# Patient Record
Sex: MALE | Race: WHITE | NOT HISPANIC OR LATINO
[De-identification: names, ages, dates, MRNs, and addresses within clinical notes are randomized per-mention and may not be internally consistent; named-entity substitution may affect disease eponyms.]

---

## 2017-04-24 ENCOUNTER — APPOINTMENT (OUTPATIENT)
Dept: HEART AND VASCULAR | Facility: CLINIC | Age: 57
End: 2017-04-24

## 2017-06-07 ENCOUNTER — APPOINTMENT (OUTPATIENT)
Dept: HEART AND VASCULAR | Facility: CLINIC | Age: 57
End: 2017-06-07

## 2017-06-07 VITALS
SYSTOLIC BLOOD PRESSURE: 120 MMHG | HEART RATE: 42 BPM | WEIGHT: 179 LBS | DIASTOLIC BLOOD PRESSURE: 80 MMHG | BODY MASS INDEX: 27.13 KG/M2 | HEIGHT: 68 IN

## 2017-06-09 ENCOUNTER — TRANSCRIPTION ENCOUNTER (OUTPATIENT)
Age: 57
End: 2017-06-09

## 2017-07-05 ENCOUNTER — APPOINTMENT (OUTPATIENT)
Dept: HEART AND VASCULAR | Facility: CLINIC | Age: 57
End: 2017-07-05

## 2017-07-05 VITALS
BODY MASS INDEX: 27.74 KG/M2 | WEIGHT: 183 LBS | DIASTOLIC BLOOD PRESSURE: 70 MMHG | HEART RATE: 70 BPM | HEIGHT: 68 IN | SYSTOLIC BLOOD PRESSURE: 110 MMHG

## 2019-02-13 ENCOUNTER — NON-APPOINTMENT (OUTPATIENT)
Age: 59
End: 2019-02-13

## 2019-02-13 ENCOUNTER — APPOINTMENT (OUTPATIENT)
Dept: HEART AND VASCULAR | Facility: CLINIC | Age: 59
End: 2019-02-13
Payer: COMMERCIAL

## 2019-02-13 VITALS
BODY MASS INDEX: 25.31 KG/M2 | DIASTOLIC BLOOD PRESSURE: 90 MMHG | SYSTOLIC BLOOD PRESSURE: 142 MMHG | HEART RATE: 53 BPM | WEIGHT: 167 LBS | HEIGHT: 68 IN

## 2019-02-13 VITALS — SYSTOLIC BLOOD PRESSURE: 150 MMHG | DIASTOLIC BLOOD PRESSURE: 66 MMHG

## 2019-02-13 PROCEDURE — 99243 OFF/OP CNSLTJ NEW/EST LOW 30: CPT | Mod: 25

## 2019-02-13 PROCEDURE — 93000 ELECTROCARDIOGRAM COMPLETE: CPT

## 2019-02-13 NOTE — PHYSICAL EXAM
[General Appearance - Well Developed] : well developed [Normal Appearance] : normal appearance [Well Groomed] : well groomed [General Appearance - Well Nourished] : well nourished [No Deformities] : no deformities [General Appearance - In No Acute Distress] : no acute distress [Normal Conjunctiva] : the conjunctiva exhibited no abnormalities [Eyelids - No Xanthelasma] : the eyelids demonstrated no xanthelasmas [Normal Oral Mucosa] : normal oral mucosa [No Oral Pallor] : no oral pallor [No Oral Cyanosis] : no oral cyanosis [Normal Jugular Venous A Waves Present] : normal jugular venous A waves present [Normal Jugular Venous V Waves Present] : normal jugular venous V waves present [No Jugular Venous Negrete A Waves] : no jugular venous negrete A waves [Respiration, Rhythm And Depth] : normal respiratory rhythm and effort [Exaggerated Use Of Accessory Muscles For Inspiration] : no accessory muscle use [Auscultation Breath Sounds / Voice Sounds] : lungs were clear to auscultation bilaterally [Heart Rate And Rhythm] : heart rate and rhythm were normal [Heart Sounds] : normal S1 and S2 [Murmurs] : no murmurs present [Arterial Pulses Normal] : the arterial pulses were normal [Abdomen Soft] : soft [Abdomen Tenderness] : non-tender [Abdomen Mass (___ Cm)] : no abdominal mass palpated [Abnormal Walk] : normal gait [Gait - Sufficient For Exercise Testing] : the gait was sufficient for exercise testing [Nail Clubbing] : no clubbing of the fingernails [Cyanosis, Localized] : no localized cyanosis [Petechial Hemorrhages (___cm)] : no petechial hemorrhages [Skin Color & Pigmentation] : normal skin color and pigmentation [] : no rash [No Venous Stasis] : no venous stasis [Skin Lesions] : no skin lesions [No Skin Ulcers] : no skin ulcer [No Xanthoma] : no  xanthoma was observed [Oriented To Time, Place, And Person] : oriented to person, place, and time [Affect] : the affect was normal [Mood] : the mood was normal [No Anxiety] : not feeling anxious

## 2019-02-20 NOTE — DISCUSSION/SUMMARY
[FreeTextEntry1] : The patient has carotid plaque and hypercholesterolemia. His blood pressure is elevated today. He has been under stress. The patient's diet is much improved and he has been exercising somewhat. The EKG is normal. Her cholesterol has been well controlled. The patient had a normal stress test 1.5 years ago. He defers a CAT scan angiogram. We will do an exercise test in six months. He will continue his current medication.
Cane/Eyeglasses

## 2019-02-20 NOTE — HISTORY OF PRESENT ILLNESS
[FreeTextEntry1] : The patient has been on a diet and lost weight. He has reduced his intake of snacks and carbohydrates and increased his intake of protein and salad. The patient exercises on the treadmill once or twice a week. He also walks. The patient has had indigestion late at night once every 2-3 months after that tomato sauce. It starts at night and lasts until the next day. The patient has occasional alcohol. He feels the need to take a deep breath. His blood pressure was recently 130/80.

## 2019-03-21 ENCOUNTER — APPOINTMENT (OUTPATIENT)
Dept: HEART AND VASCULAR | Facility: CLINIC | Age: 59
End: 2019-03-21

## 2019-04-15 ENCOUNTER — APPOINTMENT (OUTPATIENT)
Dept: HEART AND VASCULAR | Facility: CLINIC | Age: 59
End: 2019-04-15
Payer: COMMERCIAL

## 2019-04-15 VITALS — WEIGHT: 165 LBS | DIASTOLIC BLOOD PRESSURE: 66 MMHG | SYSTOLIC BLOOD PRESSURE: 142 MMHG | BODY MASS INDEX: 25.09 KG/M2

## 2019-04-15 PROCEDURE — 99213 OFFICE O/P EST LOW 20 MIN: CPT

## 2019-04-16 NOTE — DISCUSSION/SUMMARY
[FreeTextEntry1] : The patient's blood pressure is improved but still slightly elevated. He will attempt to improve his diet. He has an elevated calcium score. The patient will return for a stress test. He will continue his current medication.

## 2019-04-16 NOTE — PHYSICAL EXAM
[Normal Appearance] : normal appearance [General Appearance - Well Developed] : well developed [No Deformities] : no deformities [General Appearance - Well Nourished] : well nourished [Well Groomed] : well groomed [General Appearance - In No Acute Distress] : no acute distress [Eyelids - No Xanthelasma] : the eyelids demonstrated no xanthelasmas [Normal Conjunctiva] : the conjunctiva exhibited no abnormalities [Normal Oral Mucosa] : normal oral mucosa [No Oral Pallor] : no oral pallor [Normal Jugular Venous V Waves Present] : normal jugular venous V waves present [Normal Jugular Venous A Waves Present] : normal jugular venous A waves present [No Oral Cyanosis] : no oral cyanosis [Respiration, Rhythm And Depth] : normal respiratory rhythm and effort [No Jugular Venous Negrete A Waves] : no jugular venous negrete A waves [Heart Rate And Rhythm] : heart rate and rhythm were normal [Exaggerated Use Of Accessory Muscles For Inspiration] : no accessory muscle use [Auscultation Breath Sounds / Voice Sounds] : lungs were clear to auscultation bilaterally [Heart Sounds] : normal S1 and S2 [Murmurs] : no murmurs present [Arterial Pulses Normal] : the arterial pulses were normal [Abdomen Tenderness] : non-tender [Abdomen Soft] : soft [Abdomen Mass (___ Cm)] : no abdominal mass palpated [Abnormal Walk] : normal gait [Gait - Sufficient For Exercise Testing] : the gait was sufficient for exercise testing [Petechial Hemorrhages (___cm)] : no petechial hemorrhages [Nail Clubbing] : no clubbing of the fingernails [Cyanosis, Localized] : no localized cyanosis [Skin Color & Pigmentation] : normal skin color and pigmentation [Skin Lesions] : no skin lesions [] : no rash [No Venous Stasis] : no venous stasis [No Xanthoma] : no  xanthoma was observed [No Skin Ulcers] : no skin ulcer [Oriented To Time, Place, And Person] : oriented to person, place, and time [Affect] : the affect was normal [Mood] : the mood was normal [No Anxiety] : not feeling anxious

## 2019-04-16 NOTE — HISTORY OF PRESENT ILLNESS
[FreeTextEntry1] : The patient's diet is fairly good. His weight has been stable. He exercises twice a week.

## 2019-06-26 ENCOUNTER — APPOINTMENT (OUTPATIENT)
Dept: HEART AND VASCULAR | Facility: CLINIC | Age: 59
End: 2019-06-26
Payer: COMMERCIAL

## 2019-06-26 PROCEDURE — 93880 EXTRACRANIAL BILAT STUDY: CPT

## 2019-07-11 ENCOUNTER — APPOINTMENT (OUTPATIENT)
Dept: HEART AND VASCULAR | Facility: CLINIC | Age: 59
End: 2019-07-11

## 2019-07-24 ENCOUNTER — APPOINTMENT (OUTPATIENT)
Dept: HEART AND VASCULAR | Facility: CLINIC | Age: 59
End: 2019-07-24
Payer: COMMERCIAL

## 2019-07-24 VITALS
SYSTOLIC BLOOD PRESSURE: 136 MMHG | WEIGHT: 165 LBS | DIASTOLIC BLOOD PRESSURE: 80 MMHG | HEART RATE: 59 BPM | BODY MASS INDEX: 25.01 KG/M2 | HEIGHT: 68 IN

## 2019-07-24 PROCEDURE — 99243 OFF/OP CNSLTJ NEW/EST LOW 30: CPT | Mod: 25

## 2019-07-24 PROCEDURE — 93351 STRESS TTE COMPLETE: CPT

## 2019-07-25 NOTE — DISCUSSION/SUMMARY
[FreeTextEntry1] : The patient has atypical/non-anginal chest discomfort. He has no exertional symptoms. The stress echocardiogram was normal. His blood pressure was could improve. Diet and exercise were discussed. He will continue his current medication.

## 2019-07-25 NOTE — HISTORY OF PRESENT ILLNESS
[FreeTextEntry1] : The patient runs 3 miles without difficulty. He has occasional left lateral localized chest discomfort. It is mild and occurs at any time. He has had a vague sense of dizziness which does not interfere with his activities.

## 2019-08-26 ENCOUNTER — TRANSCRIPTION ENCOUNTER (OUTPATIENT)
Age: 59
End: 2019-08-26

## 2020-02-20 ENCOUNTER — APPOINTMENT (OUTPATIENT)
Dept: HEART AND VASCULAR | Facility: CLINIC | Age: 60
End: 2020-02-20
Payer: COMMERCIAL

## 2020-02-20 ENCOUNTER — NON-APPOINTMENT (OUTPATIENT)
Age: 60
End: 2020-02-20

## 2020-02-20 VITALS
WEIGHT: 170 LBS | HEIGHT: 68 IN | SYSTOLIC BLOOD PRESSURE: 144 MMHG | BODY MASS INDEX: 25.76 KG/M2 | HEART RATE: 58 BPM | DIASTOLIC BLOOD PRESSURE: 88 MMHG

## 2020-02-20 VITALS — DIASTOLIC BLOOD PRESSURE: 72 MMHG | SYSTOLIC BLOOD PRESSURE: 146 MMHG

## 2020-02-20 PROCEDURE — 93000 ELECTROCARDIOGRAM COMPLETE: CPT

## 2020-02-20 PROCEDURE — 99214 OFFICE O/P EST MOD 30 MIN: CPT | Mod: 25

## 2020-02-20 NOTE — PHYSICAL EXAM
[General Appearance - Well Developed] : well developed [Normal Appearance] : normal appearance [Well Groomed] : well groomed [No Deformities] : no deformities [General Appearance - Well Nourished] : well nourished [General Appearance - In No Acute Distress] : no acute distress [Normal Conjunctiva] : the conjunctiva exhibited no abnormalities [Eyelids - No Xanthelasma] : the eyelids demonstrated no xanthelasmas [Normal Oral Mucosa] : normal oral mucosa [No Oral Pallor] : no oral pallor [No Oral Cyanosis] : no oral cyanosis [Normal Jugular Venous A Waves Present] : normal jugular venous A waves present [Normal Jugular Venous V Waves Present] : normal jugular venous V waves present [No Jugular Venous Negrete A Waves] : no jugular venous negrete A waves [Respiration, Rhythm And Depth] : normal respiratory rhythm and effort [Auscultation Breath Sounds / Voice Sounds] : lungs were clear to auscultation bilaterally [Exaggerated Use Of Accessory Muscles For Inspiration] : no accessory muscle use [Heart Rate And Rhythm] : heart rate and rhythm were normal [Heart Sounds] : normal S1 and S2 [Arterial Pulses Normal] : the arterial pulses were normal [Murmurs] : no murmurs present [Abdomen Soft] : soft [Abdomen Tenderness] : non-tender [Abnormal Walk] : normal gait [Abdomen Mass (___ Cm)] : no abdominal mass palpated [Gait - Sufficient For Exercise Testing] : the gait was sufficient for exercise testing [Cyanosis, Localized] : no localized cyanosis [Petechial Hemorrhages (___cm)] : no petechial hemorrhages [Nail Clubbing] : no clubbing of the fingernails [Skin Color & Pigmentation] : normal skin color and pigmentation [] : no rash [No Venous Stasis] : no venous stasis [No Skin Ulcers] : no skin ulcer [Skin Lesions] : no skin lesions [No Xanthoma] : no  xanthoma was observed [Affect] : the affect was normal [Mood] : the mood was normal [Oriented To Time, Place, And Person] : oriented to person, place, and time [No Anxiety] : not feeling anxious

## 2020-02-20 NOTE — HISTORY OF PRESENT ILLNESS
[FreeTextEntry1] : The patient exercises with a  once a week. He does aerobic exercise 2-3 times a week. His diet is fair. His recent blood pressure at Dr. Decker was 130/80.

## 2020-02-20 NOTE — DISCUSSION/SUMMARY
[FreeTextEntry1] : The patient has a history of an elevated calcium score. His blood pressure is elevated. His EKG is essentially normal. The patient had a recent normal blood pressure reading. He will check his blood pressure at home. His cholesterol profile is excellent. He will attempt to improve his diet. He will continue his current medication.

## 2020-04-30 ENCOUNTER — TRANSCRIPTION ENCOUNTER (OUTPATIENT)
Age: 60
End: 2020-04-30

## 2020-09-10 ENCOUNTER — APPOINTMENT (OUTPATIENT)
Dept: HEART AND VASCULAR | Facility: CLINIC | Age: 60
End: 2020-09-10
Payer: COMMERCIAL

## 2020-09-10 ENCOUNTER — NON-APPOINTMENT (OUTPATIENT)
Age: 60
End: 2020-09-10

## 2020-09-10 VITALS — HEART RATE: 50 BPM | TEMPERATURE: 98.6 F | WEIGHT: 157 LBS | BODY MASS INDEX: 23.79 KG/M2 | HEIGHT: 68 IN

## 2020-09-10 VITALS — SYSTOLIC BLOOD PRESSURE: 150 MMHG | DIASTOLIC BLOOD PRESSURE: 72 MMHG

## 2020-09-10 DIAGNOSIS — R03.0 ELEVATED BLOOD-PRESSURE READING, W/OUT DIAGNOSIS OF HYPERTENSION: ICD-10-CM

## 2020-09-10 PROCEDURE — 93000 ELECTROCARDIOGRAM COMPLETE: CPT

## 2020-09-10 PROCEDURE — 99214 OFFICE O/P EST MOD 30 MIN: CPT | Mod: 25

## 2020-09-10 RX ORDER — DOXEPIN HYDROCHLORIDE 75 MG/1
CAPSULE ORAL
Refills: 0 | Status: ACTIVE | COMMUNITY

## 2020-09-10 NOTE — HISTORY OF PRESENT ILLNESS
[FreeTextEntry1] : The patient exercises three times a week. He runs for 45 minutes. His diet is fair. At the endocrinologist his blood pressure was 164 systolic. At home his blood pressure is 128-150/66-80. His sugar is 105. He is in bed for eight hours  a night. He occasionally drinks alcohol two-three times a week. He drinks decaffeinated coffee.

## 2020-09-10 NOTE — PHYSICAL EXAM
[Normal Appearance] : normal appearance [General Appearance - Well Developed] : well developed [Well Groomed] : well groomed [General Appearance - Well Nourished] : well nourished [No Deformities] : no deformities [General Appearance - In No Acute Distress] : no acute distress [Normal Conjunctiva] : the conjunctiva exhibited no abnormalities [Eyelids - No Xanthelasma] : the eyelids demonstrated no xanthelasmas [Normal Oral Mucosa] : normal oral mucosa [No Oral Pallor] : no oral pallor [No Oral Cyanosis] : no oral cyanosis [Normal Jugular Venous A Waves Present] : normal jugular venous A waves present [No Jugular Venous Negrete A Waves] : no jugular venous negrete A waves [Normal Jugular Venous V Waves Present] : normal jugular venous V waves present [Exaggerated Use Of Accessory Muscles For Inspiration] : no accessory muscle use [Respiration, Rhythm And Depth] : normal respiratory rhythm and effort [Auscultation Breath Sounds / Voice Sounds] : lungs were clear to auscultation bilaterally [Heart Rate And Rhythm] : heart rate and rhythm were normal [Heart Sounds] : normal S1 and S2 [Murmurs] : no murmurs present [Arterial Pulses Normal] : the arterial pulses were normal [Abdomen Soft] : soft [Abdomen Tenderness] : non-tender [Abdomen Mass (___ Cm)] : no abdominal mass palpated [Abnormal Walk] : normal gait [Nail Clubbing] : no clubbing of the fingernails [Gait - Sufficient For Exercise Testing] : the gait was sufficient for exercise testing [Petechial Hemorrhages (___cm)] : no petechial hemorrhages [Cyanosis, Localized] : no localized cyanosis [] : no rash [Skin Color & Pigmentation] : normal skin color and pigmentation [No Venous Stasis] : no venous stasis [Skin Lesions] : no skin lesions [No Xanthoma] : no  xanthoma was observed [No Skin Ulcers] : no skin ulcer [Oriented To Time, Place, And Person] : oriented to person, place, and time [Affect] : the affect was normal [Mood] : the mood was normal [No Anxiety] : not feeling anxious

## 2020-09-10 NOTE — DISCUSSION/SUMMARY
[FreeTextEntry1] : The patient is physically active and asymptomatic. His EKG is normal. His blood pressure at home has been slightly elevated. It is elevated in the office. He will start losartan 50 mg daily. He has a very high calcium score. He is at significant risk for coronary artery disease. The patient will undergo a CAT scan angiogram of his coronaries. We had a long discussion about taking his blood pressure at home proper diet and implications of his elevated reading. Also discussed was the risks benefits and alternatives to the CAT scan angiogram.

## 2020-09-23 ENCOUNTER — RESULT REVIEW (OUTPATIENT)
Age: 60
End: 2020-09-23

## 2020-09-23 ENCOUNTER — APPOINTMENT (OUTPATIENT)
Dept: CT IMAGING | Facility: CLINIC | Age: 60
End: 2020-09-23
Payer: SELF-PAY

## 2020-09-23 ENCOUNTER — OUTPATIENT (OUTPATIENT)
Dept: OUTPATIENT SERVICES | Facility: HOSPITAL | Age: 60
LOS: 1 days | End: 2020-09-23

## 2020-09-23 DIAGNOSIS — R93.1 ABNORMAL FINDINGS ON DIAGNOSTIC IMAGING OF HEART AND CORONARY CIRCULATION: ICD-10-CM

## 2020-09-23 PROCEDURE — 75574 CT ANGIO HRT W/3D IMAGE: CPT | Mod: 26

## 2020-09-28 ENCOUNTER — OUTPATIENT (OUTPATIENT)
Dept: OUTPATIENT SERVICES | Facility: HOSPITAL | Age: 60
LOS: 1 days | End: 2020-09-28
Payer: COMMERCIAL

## 2020-09-28 PROCEDURE — 0502T: CPT

## 2020-09-28 PROCEDURE — 0503T: CPT

## 2020-09-28 PROCEDURE — 0504T: CPT

## 2020-09-30 ENCOUNTER — APPOINTMENT (OUTPATIENT)
Dept: HEART AND VASCULAR | Facility: CLINIC | Age: 60
End: 2020-09-30
Payer: COMMERCIAL

## 2020-09-30 VITALS
SYSTOLIC BLOOD PRESSURE: 150 MMHG | BODY MASS INDEX: 23.95 KG/M2 | DIASTOLIC BLOOD PRESSURE: 84 MMHG | WEIGHT: 158 LBS | HEIGHT: 68 IN

## 2020-09-30 VITALS — TEMPERATURE: 96.4 F

## 2020-09-30 PROCEDURE — 93351 STRESS TTE COMPLETE: CPT

## 2020-09-30 PROCEDURE — 99214 OFFICE O/P EST MOD 30 MIN: CPT | Mod: 25

## 2020-10-01 NOTE — DISCUSSION/SUMMARY
[FreeTextEntry1] : The patient has a mild to moderate stenosis on his CAT scan angiogram of his coronaries. The FFR was abnormal. Reviewed by Dr. Gross indicates that this is a false positive. The stress echocardiogram is normal. Significant coronary artery disease is unlikely. The patient will have aggressive treatment of his risk factors. His blood pressure today is elevated. However his blood pressure at home is normal. He will continue his current medication.

## 2020-12-13 ENCOUNTER — TRANSCRIPTION ENCOUNTER (OUTPATIENT)
Age: 60
End: 2020-12-13

## 2021-02-17 ENCOUNTER — APPOINTMENT (OUTPATIENT)
Dept: HEART AND VASCULAR | Facility: CLINIC | Age: 61
End: 2021-02-17
Payer: COMMERCIAL

## 2021-02-17 ENCOUNTER — NON-APPOINTMENT (OUTPATIENT)
Age: 61
End: 2021-02-17

## 2021-02-17 VITALS
HEART RATE: 77 BPM | WEIGHT: 156 LBS | DIASTOLIC BLOOD PRESSURE: 96 MMHG | BODY MASS INDEX: 23.64 KG/M2 | SYSTOLIC BLOOD PRESSURE: 154 MMHG | HEIGHT: 68 IN | TEMPERATURE: 97.8 F

## 2021-02-17 VITALS — SYSTOLIC BLOOD PRESSURE: 152 MMHG | DIASTOLIC BLOOD PRESSURE: 82 MMHG

## 2021-02-17 PROCEDURE — 99072 ADDL SUPL MATRL&STAF TM PHE: CPT

## 2021-02-17 PROCEDURE — 99214 OFFICE O/P EST MOD 30 MIN: CPT | Mod: 25

## 2021-02-17 PROCEDURE — 93000 ELECTROCARDIOGRAM COMPLETE: CPT

## 2021-02-17 PROCEDURE — 93306 TTE W/DOPPLER COMPLETE: CPT

## 2021-02-18 ENCOUNTER — APPOINTMENT (OUTPATIENT)
Dept: HEART AND VASCULAR | Facility: CLINIC | Age: 61
End: 2021-02-18

## 2021-02-18 NOTE — PHYSICAL EXAM
[General Appearance - Well Developed] : well developed [Normal Appearance] : normal appearance [Well Groomed] : well groomed [No Deformities] : no deformities [General Appearance - Well Nourished] : well nourished [General Appearance - In No Acute Distress] : no acute distress [Normal Conjunctiva] : the conjunctiva exhibited no abnormalities [Eyelids - No Xanthelasma] : the eyelids demonstrated no xanthelasmas [Normal Oral Mucosa] : normal oral mucosa [No Oral Pallor] : no oral pallor [No Oral Cyanosis] : no oral cyanosis [Normal Jugular Venous A Waves Present] : normal jugular venous A waves present [Normal Jugular Venous V Waves Present] : normal jugular venous V waves present [No Jugular Venous Negrete A Waves] : no jugular venous negrete A waves [Respiration, Rhythm And Depth] : normal respiratory rhythm and effort [Exaggerated Use Of Accessory Muscles For Inspiration] : no accessory muscle use [Auscultation Breath Sounds / Voice Sounds] : lungs were clear to auscultation bilaterally [Heart Sounds] : normal S1 and S2 [Heart Rate And Rhythm] : heart rate and rhythm were normal [Murmurs] : no murmurs present [Arterial Pulses Normal] : the arterial pulses were normal [Abdomen Soft] : soft [Abdomen Tenderness] : non-tender [Abdomen Mass (___ Cm)] : no abdominal mass palpated [Abnormal Walk] : normal gait [Gait - Sufficient For Exercise Testing] : the gait was sufficient for exercise testing [Nail Clubbing] : no clubbing of the fingernails [Cyanosis, Localized] : no localized cyanosis [Petechial Hemorrhages (___cm)] : no petechial hemorrhages [Skin Color & Pigmentation] : normal skin color and pigmentation [] : no rash [No Venous Stasis] : no venous stasis [Skin Lesions] : no skin lesions [No Skin Ulcers] : no skin ulcer [No Xanthoma] : no  xanthoma was observed [Oriented To Time, Place, And Person] : oriented to person, place, and time [Affect] : the affect was normal [Mood] : the mood was normal [No Anxiety] : not feeling anxious

## 2021-02-18 NOTE — HISTORY OF PRESENT ILLNESS
[FreeTextEntry1] : The blood pressure at home is 110–130/60s.  At another physician it was 138/80.  The patient exercises on the bicycle to a good heart rate.  He is conscious of a feeling in his upper chest when he thinks about it but it is not a pain.  He also has mild lightheadedness if his head is not elevated.

## 2021-02-18 NOTE — DISCUSSION/SUMMARY
[FreeTextEntry1] : The patient has possible dizziness and a vague sensation in his chest.  His EKG is normal.  The echocardiogram shows an ejection fraction of 60-65% and mild mitral regurgitation.  One of his cholesterol indices is reduced.  He will increase his Lipitor and take 80 mg daily.  The patient had a recent CT angiogram of his coronaries.  No intervention is necessary.  The possible lightheadedness is not significant.  He will continue his other medication.

## 2021-06-07 ENCOUNTER — APPOINTMENT (OUTPATIENT)
Dept: UROLOGY | Facility: CLINIC | Age: 61
End: 2021-06-07
Payer: COMMERCIAL

## 2021-06-07 PROCEDURE — 99215 OFFICE O/P EST HI 40 MIN: CPT

## 2021-06-07 PROCEDURE — 99072 ADDL SUPL MATRL&STAF TM PHE: CPT

## 2021-06-07 PROCEDURE — 76857 US EXAM PELVIC LIMITED: CPT

## 2021-06-07 RX ORDER — CLONAZEPAM 2 MG/1
TABLET ORAL
Refills: 0 | Status: DISCONTINUED | COMMUNITY
End: 2021-06-07

## 2021-06-07 RX ORDER — CLONAZEPAM 0.5 MG/1
0.5 TABLET ORAL
Qty: 30 | Refills: 0 | Status: ACTIVE | COMMUNITY
Start: 2021-05-07

## 2021-06-07 NOTE — PHYSICAL EXAM
[General Appearance - Well Developed] : well developed [General Appearance - Well Nourished] : well nourished [Normal Appearance] : normal appearance [Well Groomed] : well groomed [General Appearance - In No Acute Distress] : no acute distress [Abdomen Soft] : soft [Abdomen Tenderness] : non-tender [Costovertebral Angle Tenderness] : no ~M costovertebral angle tenderness [Urinary Bladder Findings] : the bladder was normal on palpation [Urethral Meatus] : meatus normal [Scrotum] : the scrotum was normal [Testes Mass (___cm)] : there were no testicular masses [No Prostate Nodules] : no prostate nodules [Edema] : no peripheral edema [] : no respiratory distress [Respiration, Rhythm And Depth] : normal respiratory rhythm and effort [Exaggerated Use Of Accessory Muscles For Inspiration] : no accessory muscle use [Oriented To Time, Place, And Person] : oriented to person, place, and time [Mood] : the mood was normal [Affect] : the affect was normal [Not Anxious] : not anxious [Normal Station and Gait] : the gait and station were normal for the patient's age [No Focal Deficits] : no focal deficits [No Palpable Adenopathy] : no palpable adenopathy [Abdomen Mass (___ Cm)] : no abdominal mass palpated [Abdomen Hernia] : no hernia was discovered [Penis Abnormality] : normal circumcised penis [Epididymis] : the epididymides were normal [Testes Tenderness] : no tenderness of the testes [Prostate Tenderness] : the prostate was not tender [Skin Color & Pigmentation] : normal skin color and pigmentation [Heart Rate And Rhythm] : Heart rate and rhythm were normal

## 2021-06-08 NOTE — ADDENDUM
[FreeTextEntry1] : A portion of this note was written by [Garland Clark] on 06/04/2021 acting as a scribe for Dr. Duarte. \par \par I have personally reviewed the chart and agree that the record accurately reflects my personal performance of the history, physical exam, assessment, and plan.

## 2021-06-08 NOTE — HISTORY OF PRESENT ILLNESS
[FreeTextEntry1] : Mr. CATY PATIÑO comes in today for his urologic follow-up. He reports minimal stable chronic lower urinary tract symptoms (frequency), with nocturia x 1. He underwent a TURP in 2013 with significant improvement.\par IPSS: 2/35\par Sono: 21cc PVR; 35cc prostate\par \par Mr. Patiño had one episode of renal colic in 2013 and underwent a successful ESWL of a ureteral stone.  He has not had any recurrent episodes of colic or hematuria.\par \par Mr. Patiño has a several year onset of erectile dysfunction with difficulty achieving and maintaining erections.  He uses Viagra 50 mg as needed.\par \par PSAs: 2/10/21--2.34; 5/25/19--1.9; 1/29/19--2.3; \par \par MRI: 9/10/18--Tiny hepatic cysts; \par \par Abdominal US: 8/26/15--Normal study; \par \par Renal US: 3/7/19--No stones. 4mm right renal cyst. \par \par CT: 1/2/13--1.0cm left stone (1500 HU) at the L4 level. Minimal hydronephrosis;

## 2021-06-08 NOTE — ASSESSMENT
[FreeTextEntry1] : I discussed the findings and options with Mr. CATY PATIÑO in detail. No intervention is warranted for his stable voiding symptoms.\par \par He will continue with Viagra 50mg prn.\par \par Providing there are no new problems, I look forward to seeing Mr. Patiño in one year (bladder sono, PSA). He will have a renal sonogram at his convenience and I will call him with that result.\par

## 2021-06-08 NOTE — LETTER BODY
[Dear  ___] : Dear  [unfilled], [Consult Letter:] : I had the pleasure of evaluating your patient, [unfilled]. [Please see my note below.] : Please see my note below. [Consult Closing:] : Thank you very much for allowing me to participate in the care of this patient.  If you have any questions, please do not hesitate to contact me. [Sincerely,] : Sincerely, [DrEmiliana  ___] : Dr. BRAUN [FreeTextEntry3] : Sharif Duarte MD, FACS

## 2021-06-09 ENCOUNTER — RESULT CHARGE (OUTPATIENT)
Age: 61
End: 2021-06-09

## 2021-06-09 ENCOUNTER — APPOINTMENT (OUTPATIENT)
Dept: HEART AND VASCULAR | Facility: CLINIC | Age: 61
End: 2021-06-09
Payer: COMMERCIAL

## 2021-06-09 VITALS — DIASTOLIC BLOOD PRESSURE: 76 MMHG | HEIGHT: 68 IN | HEART RATE: 58 BPM | SYSTOLIC BLOOD PRESSURE: 133 MMHG

## 2021-06-09 PROCEDURE — 93880 EXTRACRANIAL BILAT STUDY: CPT

## 2021-06-09 PROCEDURE — 99072 ADDL SUPL MATRL&STAF TM PHE: CPT

## 2021-06-10 ENCOUNTER — NON-APPOINTMENT (OUTPATIENT)
Age: 61
End: 2021-06-10

## 2021-06-10 ENCOUNTER — APPOINTMENT (OUTPATIENT)
Dept: HEART AND VASCULAR | Facility: CLINIC | Age: 61
End: 2021-06-10
Payer: COMMERCIAL

## 2021-06-10 VITALS
TEMPERATURE: 97.8 F | WEIGHT: 161 LBS | BODY MASS INDEX: 24.4 KG/M2 | HEIGHT: 68 IN | SYSTOLIC BLOOD PRESSURE: 120 MMHG | DIASTOLIC BLOOD PRESSURE: 78 MMHG | HEART RATE: 64 BPM

## 2021-06-10 PROCEDURE — 99072 ADDL SUPL MATRL&STAF TM PHE: CPT

## 2021-06-10 PROCEDURE — 99214 OFFICE O/P EST MOD 30 MIN: CPT | Mod: 25

## 2021-06-10 PROCEDURE — 93000 ELECTROCARDIOGRAM COMPLETE: CPT

## 2021-06-11 NOTE — DISCUSSION/SUMMARY
[FreeTextEntry1] : The blood pressure is well controlled.  The patient is physically active.  He will attempt to improve his diet.  He has atypical chest discomfort.  The EKG shows only sinus bradycardia.  The recent CAT scan angiogram does not show significant obstruction.  The chest pain is noncardiac.  The patient has significant bradycardia.  He is asymptomatic.  No intervention is necessary for this.  We will continue to monitor it.  He will continue his current medication.

## 2021-06-11 NOTE — HISTORY OF PRESENT ILLNESS
[FreeTextEntry1] : The blood pressure at home is 110–127/70.  The patient has gained weight.  He exercises vigorously running or bicycling.  After exercise he feels exhausted for a few hours.  He has had a left-sided chest sensation which is in the upper chest and is localized.  It may be related to eating or walking.  The duration is unclear and it is superficial.  He has had tingling in his fingertips which is worse on the left.  His diet is fair.

## 2021-11-22 ENCOUNTER — NON-APPOINTMENT (OUTPATIENT)
Age: 61
End: 2021-11-22

## 2021-11-24 ENCOUNTER — APPOINTMENT (OUTPATIENT)
Dept: ORTHOPEDIC SURGERY | Facility: CLINIC | Age: 61
End: 2021-11-24
Payer: COMMERCIAL

## 2021-11-24 ENCOUNTER — NON-APPOINTMENT (OUTPATIENT)
Age: 61
End: 2021-11-24

## 2021-11-24 PROCEDURE — 99203 OFFICE O/P NEW LOW 30 MIN: CPT

## 2021-11-24 NOTE — DISCUSSION/SUMMARY
[de-identified] : Discussed findings of today's exam and possible causes of patient's pain.  Educated patient on their most probable diagnosis of chronic right lateral hip pain due to peritrochanteric tendinopathy, and chronic right foot pain due to intermetatarsal bursitis.  Reviewed possible courses of treatment, and we collaboratively decided best course of treatment at this time will include conservative management.  Patient will be started on a course of physical therapy to restore normal range of motion and strength as tolerated.  Patient advised to  over-the-counter metatarsal pad to be worn in his foot wear during the day to help alleviate bursitis in this area.  We discussed appropriate activity modification with focus on low impact exercise such as bike/elliptical and to take a break from running while undergoing physical therapy.  If patient has persistent hip pain may consider x-rays at next visit to elucidate how much arthritis he has and what percentage of his pain might be coming from hip arthritis, but based on clinical exam he predominantly has lateral hip pain due to peritrochanteric tendinopathy.  Follow up as needed.  Patient appreciates and agrees with current plan.\par \par I work as part of an academic orthopedic group and routinely have a physician in training (resident / fellow) working with me.  Any part of the history and physical exam performed by the physician in training was either directly reviewed and/or replicated by myself.  Any procedure performed by the physician in training was performed under my direct supervision and with the consent of the patient.\par \par This note was generated using dragon medical dictation software.  A reasonable effort has been made for proofreading its contents, but typos may still remain.  If there are any questions or points of clarification needed please notify my office.\par

## 2021-11-24 NOTE — PHYSICAL EXAM
[de-identified] : Constitutional: Well-nourished, well-developed, No acute distress\par Respiratory:  Good respiratory effort, no SOB\par Psychiatric: Pleasant and normal affect, alert and oriented x3\par Musculoskeletal: normal except where as noted in regional exam\par \par Right Hip:\par \par APPEARANCE: no marked deformities, no swelling or malalignment\par POSITIVE TENDERNESS: Greater trochanter, and mild distally along ITB\par NONTENDER: TFL, gluteal region, ischium/proximal hamstring region, hip flexor region, sartorius and pubic symphysis. \par ROM: full & painless. \par RESISTIVE TESTING: Mild pain in lateral hip with resisted abduction, painless resisted ER/IR/SLR/adduction. \par SPECIAL TESTS: neg KENIA/FADIR, painless loaded flexion & scouring\par \par Right foot:\par APPEARANCE: no swelling, no marked deformities or malalignment\par POSITIVE TENDERNESS: Mild pain between first/second and second/third metatarsal heads\par NONTENDER: 5th metatarsal base, cuboid, 1st MTP, dorsum & plantar surfaces, medial heel, mid heel. \par ROM: normal throughout foot, ankle, and digits. \par RESISTIVE TESTING: painless flex/ext, abd/add of all digits. \par SPECIAL TESTS:  neg Tinel's at tarsal tunnel. \par

## 2021-11-24 NOTE — HISTORY OF PRESENT ILLNESS
[de-identified] : 61 year male presents for evaluation of right lateral hip/groin pain x 3 months and right foot pain x 1.5 months. Denies any specific trauma or injury. He states that he went for a run at about 3 months ago, and felt a popping sensation at the IT band. States for the past few months he has been feeling a sharp pain in the groin. Also complains of right foot pain that occurs with weight bearing. Denies radiating leg pain. Denies numbness/tingling. Leaning on the right leg aggravates the groin pain and pushing off the right foot while walking aggravates the pain. Notes that has a limp with walking. Has tried stretching exercises which he states helps somewhat temporarily. Takes advil occasionally but does not have relief. \par States that he has history of right hamstring injury several years ago.

## 2022-01-20 ENCOUNTER — NON-APPOINTMENT (OUTPATIENT)
Age: 62
End: 2022-01-20

## 2022-01-20 ENCOUNTER — APPOINTMENT (OUTPATIENT)
Dept: HEART AND VASCULAR | Facility: CLINIC | Age: 62
End: 2022-01-20
Payer: COMMERCIAL

## 2022-01-20 VITALS
DIASTOLIC BLOOD PRESSURE: 76 MMHG | HEART RATE: 79 BPM | SYSTOLIC BLOOD PRESSURE: 144 MMHG | OXYGEN SATURATION: 97 % | BODY MASS INDEX: 24.4 KG/M2 | TEMPERATURE: 98 F | HEIGHT: 68 IN | WEIGHT: 161 LBS

## 2022-01-20 VITALS — SYSTOLIC BLOOD PRESSURE: 134 MMHG | DIASTOLIC BLOOD PRESSURE: 70 MMHG

## 2022-01-20 PROCEDURE — 36415 COLL VENOUS BLD VENIPUNCTURE: CPT

## 2022-01-20 PROCEDURE — 99396 PREV VISIT EST AGE 40-64: CPT | Mod: 25

## 2022-01-20 PROCEDURE — 93000 ELECTROCARDIOGRAM COMPLETE: CPT

## 2022-01-20 NOTE — HISTORY OF PRESENT ILLNESS
[FreeTextEntry1] : The patient had COVID in mid December.  His home blood pressure is 116/62.  He works out with a  twice a week.  He sees the dermatologist yearly.  He sees the psychiatrist Dr. Willem Weinstein.  He had a recent eye examination.  He had 3 Moderna vaccines.  He had the flu vaccine.

## 2022-01-20 NOTE — DISCUSSION/SUMMARY
[FreeTextEntry1] : Home blood pressure is very good.  The EKG done for hypertension is normal.  The patient is seeing the dermatologist and the psychiatrist.  He is up-to-date on his health maintenance exams.  He is up-to-date on his vaccinations.  The patient will contact me what the names of his other providers. He is not having any cognitive impairment. He has no significant current or past depression. He is fully functional and there are no safety issues. He will be screened for eye examination, Colonoscopy  and Immunizations over the next 10 years. The patient was furnished with personalized health advice and does not need referral to health education or preventative counseling services. Patient does not need advanced care planning services.  Laboratory testing was done.

## 2022-01-20 NOTE — PHYSICAL EXAM
[Well Developed] : well developed [Well Nourished] : well nourished [No Acute Distress] : no acute distress [Normal Conjunctiva] : normal conjunctiva [Normal Venous Pressure] : normal venous pressure [No Carotid Bruit] : no carotid bruit [Normal S1, S2] : normal S1, S2 [No Murmur] : no murmur [No Rub] : no rub [No Gallop] : no gallop [Clear Lung Fields] : clear lung fields [Good Air Entry] : good air entry [No Respiratory Distress] : no respiratory distress  [Soft] : abdomen soft [Non Tender] : non-tender [No Masses/organomegaly] : no masses/organomegaly [Normal Bowel Sounds] : normal bowel sounds [Normal Gait] : normal gait [No Edema] : no edema [No Cyanosis] : no cyanosis [No Clubbing] : no clubbing [No Varicosities] : no varicosities [Normal DP B/L] : normal DP B/L [No Rash] : no rash [No Skin Lesions] : no skin lesions [Moves all extremities] : moves all extremities [No Focal Deficits] : no focal deficits [Normal Speech] : normal speech [Alert and Oriented] : alert and oriented [Normal memory] : normal memory [de-identified] : Rectal testicular and hernia exams are done by the urologist

## 2022-01-21 LAB
ALBUMIN SERPL ELPH-MCNC: 5.1 G/DL
ALP BLD-CCNC: 86 U/L
ALT SERPL-CCNC: 36 U/L
ANION GAP SERPL CALC-SCNC: 13 MMOL/L
AST SERPL-CCNC: 27 U/L
BASOPHILS # BLD AUTO: 0.03 K/UL
BASOPHILS NFR BLD AUTO: 0.7 %
BILIRUB DIRECT SERPL-MCNC: 0.3 MG/DL
BILIRUB INDIRECT SERPL-MCNC: 0.7 MG/DL
BILIRUB SERPL-MCNC: 1 MG/DL
BUN SERPL-MCNC: 18 MG/DL
CALCIUM SERPL-MCNC: 9.4 MG/DL
CHLORIDE SERPL-SCNC: 103 MMOL/L
CHOLEST SERPL-MCNC: 140 MG/DL
CO2 SERPL-SCNC: 25 MMOL/L
CREAT SERPL-MCNC: 0.96 MG/DL
EOSINOPHIL # BLD AUTO: 0.07 K/UL
EOSINOPHIL NFR BLD AUTO: 1.6 %
ESTIMATED AVERAGE GLUCOSE: 80 MG/DL
GLUCOSE SERPL-MCNC: 100 MG/DL
HBA1C MFR BLD HPLC: 4.4 %
HCT VFR BLD CALC: 43.4 %
HDLC SERPL-MCNC: 64 MG/DL
HGB BLD-MCNC: 14.1 G/DL
IMM GRANULOCYTES NFR BLD AUTO: 0.2 %
LDLC SERPL CALC-MCNC: 66 MG/DL
LYMPHOCYTES # BLD AUTO: 1.52 K/UL
LYMPHOCYTES NFR BLD AUTO: 35.2 %
MAN DIFF?: NORMAL
MCHC RBC-ENTMCNC: 31.3 PG
MCHC RBC-ENTMCNC: 32.5 GM/DL
MCV RBC AUTO: 96.2 FL
MONOCYTES # BLD AUTO: 0.47 K/UL
MONOCYTES NFR BLD AUTO: 10.9 %
NEUTROPHILS # BLD AUTO: 2.22 K/UL
NEUTROPHILS NFR BLD AUTO: 51.4 %
NONHDLC SERPL-MCNC: 76 MG/DL
PLATELET # BLD AUTO: 192 K/UL
POTASSIUM SERPL-SCNC: 4.7 MMOL/L
PROT SERPL-MCNC: 7 G/DL
PSA FREE FLD-MCNC: 28 %
PSA FREE SERPL-MCNC: 0.82 NG/ML
PSA SERPL-MCNC: 2.89 NG/ML
RBC # BLD: 4.51 M/UL
RBC # FLD: 11.7 %
SODIUM SERPL-SCNC: 142 MMOL/L
TRIGL SERPL-MCNC: 51 MG/DL
TSH SERPL-ACNC: 1.62 UIU/ML
WBC # FLD AUTO: 4.32 K/UL

## 2022-01-24 LAB — APO LP(A) SERPL-MCNC: 17.6 NMOL/L

## 2022-03-02 ENCOUNTER — RX RENEWAL (OUTPATIENT)
Age: 62
End: 2022-03-02

## 2022-04-26 NOTE — PHYSICAL EXAM

## 2022-04-28 ENCOUNTER — APPOINTMENT (OUTPATIENT)
Dept: UROLOGY | Facility: CLINIC | Age: 62
End: 2022-04-28
Payer: COMMERCIAL

## 2022-04-28 VITALS
HEIGHT: 68 IN | BODY MASS INDEX: 24.71 KG/M2 | DIASTOLIC BLOOD PRESSURE: 82 MMHG | RESPIRATION RATE: 18 BRPM | SYSTOLIC BLOOD PRESSURE: 158 MMHG | WEIGHT: 163 LBS | TEMPERATURE: 97.2 F | HEART RATE: 91 BPM

## 2022-04-28 LAB
BILIRUB UR QL STRIP: NORMAL
CLARITY UR: CLEAR
COLLECTION METHOD: NORMAL
GLUCOSE UR-MCNC: NORMAL
HCG UR QL: 0.2 EU/DL
HGB UR QL STRIP.AUTO: NORMAL
KETONES UR-MCNC: NORMAL
LEUKOCYTE ESTERASE UR QL STRIP: NORMAL
NITRITE UR QL STRIP: NORMAL
PH UR STRIP: 5.5
PROT UR STRIP-MCNC: NORMAL
SP GR UR STRIP: 1.01

## 2022-04-28 PROCEDURE — 81003 URINALYSIS AUTO W/O SCOPE: CPT | Mod: QW

## 2022-04-28 PROCEDURE — 76857 US EXAM PELVIC LIMITED: CPT

## 2022-04-28 PROCEDURE — 99214 OFFICE O/P EST MOD 30 MIN: CPT | Mod: 25

## 2022-04-28 NOTE — HISTORY OF PRESENT ILLNESS
[FreeTextEntry1] : Mr. CATY PATIÑO returns for his annual urologic follow-up. He reports minimal stable chronic lower urinary tract symptoms (frequency), with nocturia x 0-1. He underwent a TURP in 2013 with significant improvement.\par IPSS: 3/35\par Sono (performed to assess bladder emptying): 30cc PVR; 34cc prostate\par \par Mr. Patiño had one episode of renal colic in 2013 and underwent a successful ESWL of a ureteral stone.  He has not had any recurrent episodes of colic or hematuria.\par \par Mr. Patiño has a several year onset of erectile dysfunction with difficulty achieving and maintaining erections.  He uses Viagra 50 mg as needed.\par \par PSAs:  1/21/22--2.89;  2/10/21--2.34; 5/25/19--1.9; 1/29/19--2.3; \par \par MRI: 9/10/18--Tiny hepatic cysts; \par \par Abdominal US: 8/26/15--Normal study; \par \par Renal US: 6/23/21--No intrarenal calculi or hydronephrosis. Unchanged 5mm simple right midpole cortical cyst; 3/7/19--No stones. 4mm right renal cyst. \par \par CT: 1/2/13--1.0cm left stone (1500 HU) at the L4 level. Minimal hydronephrosis;

## 2022-04-28 NOTE — ADDENDUM
[FreeTextEntry1] : A portion of this note was written by [Garland Clark] on 04/26/2022 acting as a scribe for Dr. Duarte. \par \par I have personally reviewed the chart and agree that the record accurately reflects my personal performance of the history, physical exam, assessment, and plan.

## 2022-04-28 NOTE — ASSESSMENT
[FreeTextEntry1] : I discussed the findings and options with Mr. CATY PATIÑO in detail. No intervention is warranted for his stable voiding symptoms.\par \par He will continue with Viagra 1/2 100mg prn.\par \par Providing there are no new problems, I look forward to seeing Mr. Patiño in one year (bladder sono, PSA). He will have a renal sonogram  prior to his next visit.\par

## 2022-06-27 ENCOUNTER — APPOINTMENT (OUTPATIENT)
Dept: ORTHOPEDIC SURGERY | Facility: CLINIC | Age: 62
End: 2022-06-27
Payer: COMMERCIAL

## 2022-06-27 VITALS — HEIGHT: 68 IN | BODY MASS INDEX: 24.71 KG/M2 | WEIGHT: 163 LBS

## 2022-06-27 PROCEDURE — 72100 X-RAY EXAM L-S SPINE 2/3 VWS: CPT

## 2022-06-27 PROCEDURE — 72170 X-RAY EXAM OF PELVIS: CPT

## 2022-06-27 PROCEDURE — 99203 OFFICE O/P NEW LOW 30 MIN: CPT

## 2022-06-27 NOTE — PHYSICAL EXAM
[Disc space narrowing] : Disc space narrowing [Scoliosis] : Scoliosis [AP] : anteroposterior [There are no fractures, subluxations or dislocations. No significant abnormalities are seen] : There are no fractures, subluxations or dislocations. No significant abnormalities are seen [] : no swelling

## 2022-06-27 NOTE — HISTORY OF PRESENT ILLNESS
[6] : 6 [3] : 3 [Dull/Aching] : dull/aching [Sharp] : sharp [Intermittent] : intermittent [Nothing helps with pain getting better] : Nothing helps with pain getting better [de-identified] : Developed right buttock/hip/thigh pain after hiking/biking over the past few days. No prior back issues. No bowel or bladder changes. No pain below knee to foot. PMH: HTN, high cholesterol [] : no [FreeTextEntry1] : Rt Hip and Leg [FreeTextEntry3] : 6/17/2022 [FreeTextEntry5] : Pt has pain starting in his lower back which radiates down his legs. Denies traumatic injury or N/T

## 2022-07-11 ENCOUNTER — APPOINTMENT (OUTPATIENT)
Dept: ORTHOPEDIC SURGERY | Facility: CLINIC | Age: 62
End: 2022-07-11

## 2022-07-11 PROCEDURE — 99213 OFFICE O/P EST LOW 20 MIN: CPT

## 2022-07-11 NOTE — HISTORY OF PRESENT ILLNESS
[Right Leg] : right leg [6] : 6 [3] : 3 [Dull/Aching] : dull/aching [Sharp] : sharp [Intermittent] : intermittent [Nothing helps with pain getting better] : Nothing helps with pain getting better [de-identified] : Developed right buttock/hip/thigh pain after hiking/biking over the past few days. No prior back issues. No bowel or bladder changes. No pain below knee to foot. PMH: HTN, high cholesterol [] : no [FreeTextEntry1] : Rt Hip and Leg [FreeTextEntry3] : 6/17/2022 [FreeTextEntry5] : Pt has pain starting in his lower back which radiates down his legs. Denies traumatic injury or N/T [FreeTextEntry9] : home exercises, gym acupuncture

## 2022-08-03 ENCOUNTER — RX RENEWAL (OUTPATIENT)
Age: 62
End: 2022-08-03

## 2022-08-22 ENCOUNTER — APPOINTMENT (OUTPATIENT)
Dept: ORTHOPEDIC SURGERY | Facility: CLINIC | Age: 62
End: 2022-08-22

## 2022-12-03 ENCOUNTER — NON-APPOINTMENT (OUTPATIENT)
Age: 62
End: 2022-12-03

## 2023-01-03 ENCOUNTER — NON-APPOINTMENT (OUTPATIENT)
Age: 63
End: 2023-01-03

## 2023-01-17 ENCOUNTER — APPOINTMENT (OUTPATIENT)
Dept: HEART AND VASCULAR | Facility: CLINIC | Age: 63
End: 2023-01-17
Payer: MEDICARE

## 2023-01-17 VITALS
BODY MASS INDEX: 25.46 KG/M2 | HEIGHT: 68 IN | DIASTOLIC BLOOD PRESSURE: 78 MMHG | HEART RATE: 75 BPM | WEIGHT: 168 LBS | SYSTOLIC BLOOD PRESSURE: 130 MMHG

## 2023-01-17 DIAGNOSIS — Z86.79 PERSONAL HISTORY OF OTHER DISEASES OF THE CIRCULATORY SYSTEM: ICD-10-CM

## 2023-01-17 PROCEDURE — ZZZZZ: CPT

## 2023-01-17 PROCEDURE — 36415 COLL VENOUS BLD VENIPUNCTURE: CPT

## 2023-01-17 PROCEDURE — 93015 CV STRESS TEST SUPVJ I&R: CPT

## 2023-01-17 PROCEDURE — G0402 INITIAL PREVENTIVE EXAM: CPT

## 2023-01-17 RX ORDER — CLONAZEPAM 0.5 MG/1
0.5 TABLET ORAL
Refills: 0 | Status: ACTIVE | COMMUNITY

## 2023-01-17 RX ORDER — ZOLPIDEM TARTRATE 10 MG/1
10 TABLET ORAL
Qty: 10 | Refills: 0 | Status: COMPLETED | COMMUNITY
Start: 2022-12-02

## 2023-01-17 RX ORDER — METHYLPREDNISOLONE 4 MG/1
4 TABLET ORAL
Qty: 1 | Refills: 0 | Status: COMPLETED | COMMUNITY
Start: 2022-06-27 | End: 2023-01-17

## 2023-01-17 NOTE — PHYSICAL EXAM
[Well Developed] : well developed [Well Nourished] : well nourished [No Acute Distress] : no acute distress [Normal Conjunctiva] : normal conjunctiva [Normal Venous Pressure] : normal venous pressure [No Carotid Bruit] : no carotid bruit [Normal S1, S2] : normal S1, S2 [No Murmur] : no murmur [No Rub] : no rub [No Gallop] : no gallop [Clear Lung Fields] : clear lung fields [Good Air Entry] : good air entry [No Respiratory Distress] : no respiratory distress  [Soft] : abdomen soft [Non Tender] : non-tender [No Masses/organomegaly] : no masses/organomegaly [Normal Bowel Sounds] : normal bowel sounds [Normal Gait] : normal gait [No Edema] : no edema [No Cyanosis] : no cyanosis [No Clubbing] : no clubbing [No Varicosities] : no varicosities [Normal DP B/L] : normal DP B/L [No Rash] : no rash [No Skin Lesions] : no skin lesions [Moves all extremities] : moves all extremities [No Focal Deficits] : no focal deficits [Normal Speech] : normal speech [Alert and Oriented] : alert and oriented [Normal memory] : normal memory [de-identified] : Mild to moderate wax. [de-identified] : Rectal testicular and hernia exams are done by the urologist

## 2023-01-17 NOTE — DISCUSSION/SUMMARY
[FreeTextEntry1] : The patient is physically active without difficulty.  Buttocks pain has resolved.  He will attempt to improve his diet.  He is up-to-date on his health maintenance exams.  We we will obtain the results of his bone density.  His stress test is normal.  He is at increased risk because of his calcium score but he has not had significant coronary artery disease.  We will aggressively manage his risk factors.  The patient will contact me what the names of his other providers. He is not having any cognitive impairment. He has no significant current or past depression. He is fully functional and there are no safety issues. He will be screened for eye examination, Colonoscopy  and Immunizations over the next 10 years. The patient was furnished with personalized health advice and does not need referral to health education or preventative counseling services. Patient does not need advanced care planning services.  Laboratory testing was done. [EKG obtained to assist in diagnosis and management of assessed problem(s)] : EKG obtained to assist in diagnosis and management of assessed problem(s)

## 2023-01-17 NOTE — HISTORY OF PRESENT ILLNESS
[FreeTextEntry1] : The patient had right buttocks pain not during exercise which resolved.  His diet is fair.  He is getting an eye exam in March, dermatologic visit in April and neurologic visit in May.  He recently saw the ENT and wax was removed.

## 2023-01-18 LAB
ALBUMIN SERPL ELPH-MCNC: 4.8 G/DL
ALP BLD-CCNC: 82 U/L
ALT SERPL-CCNC: 34 U/L
ANION GAP SERPL CALC-SCNC: 10 MMOL/L
AST SERPL-CCNC: 26 U/L
BASOPHILS # BLD AUTO: 0.02 K/UL
BASOPHILS NFR BLD AUTO: 0.5 %
BILIRUB DIRECT SERPL-MCNC: 0.3 MG/DL
BILIRUB INDIRECT SERPL-MCNC: 0.9 MG/DL
BILIRUB SERPL-MCNC: 1.2 MG/DL
BUN SERPL-MCNC: 20 MG/DL
CALCIUM SERPL-MCNC: 9.3 MG/DL
CHLORIDE SERPL-SCNC: 103 MMOL/L
CHOLEST SERPL-MCNC: 121 MG/DL
CO2 SERPL-SCNC: 27 MMOL/L
CREAT SERPL-MCNC: 1.04 MG/DL
EGFR: 80 ML/MIN/1.73M2
EOSINOPHIL # BLD AUTO: 0 K/UL
EOSINOPHIL NFR BLD AUTO: 0 %
ESTIMATED AVERAGE GLUCOSE: 85 MG/DL
GLUCOSE SERPL-MCNC: 104 MG/DL
HBA1C MFR BLD HPLC: 4.6 %
HCT VFR BLD CALC: 42.5 %
HDLC SERPL-MCNC: 58 MG/DL
HGB BLD-MCNC: 14.6 G/DL
IMM GRANULOCYTES NFR BLD AUTO: 0 %
LDLC SERPL CALC-MCNC: 53 MG/DL
LYMPHOCYTES # BLD AUTO: 1.4 K/UL
LYMPHOCYTES NFR BLD AUTO: 36.5 %
MAN DIFF?: NORMAL
MCHC RBC-ENTMCNC: 32.2 PG
MCHC RBC-ENTMCNC: 34.4 GM/DL
MCV RBC AUTO: 93.6 FL
MONOCYTES # BLD AUTO: 0.38 K/UL
MONOCYTES NFR BLD AUTO: 9.9 %
NEUTROPHILS # BLD AUTO: 2.04 K/UL
NEUTROPHILS NFR BLD AUTO: 53.1 %
NONHDLC SERPL-MCNC: 63 MG/DL
PLATELET # BLD AUTO: 169 K/UL
POTASSIUM SERPL-SCNC: 5.2 MMOL/L
PROT SERPL-MCNC: 6.8 G/DL
PSA SERPL-MCNC: 2.52 NG/ML
RBC # BLD: 4.54 M/UL
RBC # FLD: 11.3 %
SODIUM SERPL-SCNC: 140 MMOL/L
TRIGL SERPL-MCNC: 48 MG/DL
TSH SERPL-ACNC: 1.79 UIU/ML
WBC # FLD AUTO: 3.84 K/UL

## 2023-01-19 ENCOUNTER — APPOINTMENT (OUTPATIENT)
Dept: HEART AND VASCULAR | Facility: CLINIC | Age: 63
End: 2023-01-19
Payer: MEDICARE

## 2023-01-19 VITALS
SYSTOLIC BLOOD PRESSURE: 146 MMHG | BODY MASS INDEX: 25.76 KG/M2 | HEART RATE: 82 BPM | DIASTOLIC BLOOD PRESSURE: 78 MMHG | WEIGHT: 170 LBS | HEIGHT: 68 IN

## 2023-01-19 PROCEDURE — 93880 EXTRACRANIAL BILAT STUDY: CPT

## 2023-01-31 ENCOUNTER — APPOINTMENT (OUTPATIENT)
Dept: ENDOCRINOLOGY | Facility: CLINIC | Age: 63
End: 2023-01-31
Payer: MEDICARE

## 2023-01-31 VITALS
DIASTOLIC BLOOD PRESSURE: 78 MMHG | SYSTOLIC BLOOD PRESSURE: 146 MMHG | WEIGHT: 169 LBS | HEIGHT: 68 IN | HEART RATE: 59 BPM | BODY MASS INDEX: 25.61 KG/M2

## 2023-01-31 DIAGNOSIS — Z82.49 FAMILY HISTORY OF ISCHEMIC HEART DISEASE AND OTHER DISEASES OF THE CIRCULATORY SYSTEM: ICD-10-CM

## 2023-01-31 PROCEDURE — 99203 OFFICE O/P NEW LOW 30 MIN: CPT

## 2023-02-06 NOTE — ASSESSMENT
[FreeTextEntry1] : 1. 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, typically found in benign follicular adenomas with ~3% residual cancer probability\par Most recent Thyroid US, following FNA, from 10/5/2021 revealing stable thyroid nodules with exception of one  new 0.4cm right mid/lower nodule\par Counseling provided regarding thyroid nodules (epidemiology, etiology of formation, rate of cancer- benign 85-95% of time, evaluation with focus on surveillance of nodule with recommended repeat thyroid US now, given new nodule (albeit subcm) appreciated from 10/2021 thyroid US \par 1/17/2023 TSH 1.79\par -- repeat thyroid US, referral provided today, will return to [facility] for imaging\par \par Schedule thyroid US, I will call with results\par Follow-up in 1.5-2 years for continued surveillance, or sooner, if FNA needed\par \par Suellen Ballard MS, FNP-BC, [facility]\par 01/31/2023

## 2023-02-06 NOTE — PHYSICAL EXAM
[Alert] : alert [Healthy Appearance] : healthy appearance [No Acute Distress] : no acute distress [Normal Voice/Communication] : normal voice communication [Normal Hearing] : hearing was normal [No LAD] : no lymphadenopathy [Thyroid Not Enlarged] : the thyroid was not enlarged [No Respiratory Distress] : no respiratory distress [Normal Rate and Effort] : normal respiratory rate and effort [Clear to Auscultation] : lungs were clear to auscultation bilaterally [Normal S1, S2] : normal S1 and S2 [Normal Rate] : heart rate was normal [Regular Rhythm] : with a regular rhythm [Normal Gait] : normal gait [Oriented x3] : oriented to person, place, and time [Normal Affect] : the affect was normal [Normal Insight/Judgement] : insight and judgment were intact [Normal Mood] : the mood was normal [de-identified] : right sided, mid ~1.5cm palpable nodule

## 2023-02-06 NOTE — DATA REVIEWED
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

## 2023-02-06 NOTE — HISTORY OF PRESENT ILLNESS
[FreeTextEntry1] : Mr. PATIÑO is a 63 year male with pmhx of thyroid nodules, HTN, HLD, nonobstructive CAD who presents for thyroid evaluation.\par \par Dx with thyroid nodules many years ago on physical exam with past provider, Dr. Decker\par Follows cardiologist, Dr. Dunaway\par Previously following Dr. Valentina Albert at [facility], last visit, 10/2021, presents to establish care\par \par Endorses h/o FNA with Dr. Albert in 2020 following growth of one nodule\par Repeat thyroid US 10/2021, which is his most recent US, with report, as below\par Has his Thyroid US's performed at [facility]\par \par CATY reports: \par No family history of thyroid disease \par No family history of thyroid cancer \par No  personal history of neck radiation exposure \par No personal history of tobacco use \par Denies compressive symptoms\par \par Planning to visit children in [place] later this week\par

## 2023-02-06 NOTE — ADDENDUM
[FreeTextEntry1] : 2/6/2023, addendum, SG--\par VM left requesting return call to discuss thyroid US\par 1.6cm TR4 nodule, previously bx in 2020 increased in size of 2 dimensions >20% and volume >50%.  For this, recommend re-biopsy of this nodule.  \par \par 2/6/23, addendum\par Returned patients call\par Thyroid US discussed and recommend re-biopsy of now 1.6cm nodule, as above.  Prefers our location with Dr. Shankar for FNA\par

## 2023-02-28 ENCOUNTER — APPOINTMENT (OUTPATIENT)
Dept: ENDOCRINOLOGY | Facility: CLINIC | Age: 63
End: 2023-02-28
Payer: MEDICARE

## 2023-02-28 ENCOUNTER — RESULT REVIEW (OUTPATIENT)
Age: 63
End: 2023-02-28

## 2023-02-28 VITALS
DIASTOLIC BLOOD PRESSURE: 80 MMHG | WEIGHT: 169 LBS | BODY MASS INDEX: 25.7 KG/M2 | SYSTOLIC BLOOD PRESSURE: 153 MMHG | HEART RATE: 74 BPM

## 2023-02-28 PROCEDURE — 10005 FNA BX W/US GDN 1ST LES: CPT

## 2023-02-28 PROCEDURE — 99214 OFFICE O/P EST MOD 30 MIN: CPT | Mod: 25

## 2023-03-02 NOTE — PROCEDURE
[Fine Needle Aspiration] : Fine needle aspiration ~T ~C was performed. [Risks] : risks [Benefits] : benefits [Alternatives] : alternatives [Consent Obtained] : Written consent was obtained prior to the procedure and is detailed in the patient's record [Patient] : the patient [Ethyl Chloride] : ethyl chloride [Supine] : The patient was placed in the supine position with the neck extended as tolerated. [Alcohol] : with alcohol [25 gauge 1.5 inch] : A 25 gauge 1.5 inch needle was used [3 Passes] : 3 passes were made through the mass [Ultrasonic Guidance] : ultrasound guidance was employed [Sent to Histology] : The specimens were prepared in the usual manner and sent to histology. [Afirma] : Afirma [Tolerated Well] : the patient tolerated the procedure well [Vital Signs Stable] : the vital signs were stable [Hemostasis] : hemostasis was assured and the patient was discharged in satisfactory condition [No Complications] : There were no complications [Instructions Given] : Handouts/patient instructions were given to patient [PRN] : as needed. [de-identified] : R thyroid lobe nodule

## 2023-03-10 ENCOUNTER — OFFICE (OUTPATIENT)
Dept: URBAN - METROPOLITAN AREA CLINIC 70 | Facility: CLINIC | Age: 63
Setting detail: OPHTHALMOLOGY
End: 2023-03-10
Payer: MEDICARE

## 2023-03-10 DIAGNOSIS — D31.32: ICD-10-CM

## 2023-03-10 DIAGNOSIS — H40.013: ICD-10-CM

## 2023-03-10 PROCEDURE — 92250 FUNDUS PHOTOGRAPHY W/I&R: CPT | Performed by: STUDENT IN AN ORGANIZED HEALTH CARE EDUCATION/TRAINING PROGRAM

## 2023-03-10 PROCEDURE — 92014 COMPRE OPH EXAM EST PT 1/>: CPT | Performed by: STUDENT IN AN ORGANIZED HEALTH CARE EDUCATION/TRAINING PROGRAM

## 2023-03-10 ASSESSMENT — REFRACTION_AUTOREFRACTION
OD_SPHERE: +0.50
OS_SPHERE: +0.50
OD_CYLINDER: -2.75
OD_AXIS: 091
OS_CYLINDER: -3.00
OS_AXIS: 085

## 2023-03-10 ASSESSMENT — SPHEQUIV_DERIVED
OD_SPHEQUIV: -0.875
OS_SPHEQUIV: -1

## 2023-03-10 ASSESSMENT — KERATOMETRY
OD_K1POWER_DIOPTERS: 44.25
OD_K2POWER_DIOPTERS: 45.25
OS_K2POWER_DIOPTERS: 45.50
OS_AXISANGLE_DEGREES: 176
OS_K1POWER_DIOPTERS: 44.25
OD_AXISANGLE_DEGREES: 003

## 2023-03-10 ASSESSMENT — REFRACTION_CURRENTRX
OD_AXIS: 095
OD_OVR_VA: 20/
OD_SPHERE: +3.00
OS_SPHERE: +3.00
OD_CYLINDER: -2.75
OS_SPHERE: +0.50
OD_CYLINDER: -2.75
OS_CYLINDER: -2.75
OS_OVR_VA: 20/
OD_OVR_VA: 20/
OD_SPHERE: +0.50
OS_CYLINDER: -2.75
OS_OVR_VA: 20/
OS_AXIS: 080
OD_AXIS: 096
OS_AXIS: 077

## 2023-03-10 ASSESSMENT — CONFRONTATIONAL VISUAL FIELD TEST (CVF)
OS_FINDINGS: FULL
OD_FINDINGS: FULL

## 2023-03-10 ASSESSMENT — VISUAL ACUITY
OD_BCVA: 20/20
OS_BCVA: 20/20

## 2023-03-10 ASSESSMENT — AXIALLENGTH_DERIVED
OD_AL: 23.4743
OS_AL: 23.477

## 2023-03-30 ENCOUNTER — OFFICE (OUTPATIENT)
Dept: URBAN - METROPOLITAN AREA CLINIC 12 | Facility: CLINIC | Age: 63
Setting detail: OPHTHALMOLOGY
End: 2023-03-30
Payer: MEDICARE

## 2023-03-30 DIAGNOSIS — D31.32: ICD-10-CM

## 2023-03-30 DIAGNOSIS — H40.013: ICD-10-CM

## 2023-03-30 PROCEDURE — 92020 GONIOSCOPY: CPT | Performed by: OPHTHALMOLOGY

## 2023-03-30 PROCEDURE — 92083 EXTENDED VISUAL FIELD XM: CPT | Performed by: OPHTHALMOLOGY

## 2023-03-30 PROCEDURE — 99213 OFFICE O/P EST LOW 20 MIN: CPT | Performed by: OPHTHALMOLOGY

## 2023-03-30 PROCEDURE — 92133 CPTRZD OPH DX IMG PST SGM ON: CPT | Performed by: OPHTHALMOLOGY

## 2023-03-30 ASSESSMENT — AXIALLENGTH_DERIVED
OD_AL: 23.617
OS_AL: 23.4715

## 2023-03-30 ASSESSMENT — REFRACTION_CURRENTRX
OD_OVR_VA: 20/
OD_AXIS: 095
OS_OVR_VA: 20/
OS_OVR_VA: 20/
OD_SPHERE: +0.50
OS_SPHERE: +3.00
OS_CYLINDER: -2.75
OS_AXIS: 080
OS_AXIS: 077
OD_CYLINDER: -2.75
OD_OVR_VA: 20/
OD_CYLINDER: -2.75
OS_CYLINDER: -2.75
OD_SPHERE: +3.00
OD_AXIS: 096
OS_SPHERE: +0.50

## 2023-03-30 ASSESSMENT — CONFRONTATIONAL VISUAL FIELD TEST (CVF)
OD_FINDINGS: FULL
OS_FINDINGS: FULL

## 2023-03-30 ASSESSMENT — VISUAL ACUITY
OS_BCVA: 20/20-
OD_BCVA: 20/20-2

## 2023-03-30 ASSESSMENT — TONOMETRY
OS_IOP_MMHG: 16
OD_IOP_MMHG: 14

## 2023-03-30 ASSESSMENT — KERATOMETRY
OS_AXISANGLE_DEGREES: 171
OD_K1POWER_DIOPTERS: 44.00
OD_K2POWER_DIOPTERS: 45.25
OS_K1POWER_DIOPTERS: 43.75
OD_AXISANGLE_DEGREES: 179
OS_K2POWER_DIOPTERS: 45.50

## 2023-03-30 ASSESSMENT — REFRACTION_AUTOREFRACTION
OD_AXIS: 091
OS_AXIS: 084
OS_SPHERE: +0.75
OD_SPHERE: +0.25
OS_CYLINDER: -3.00
OD_CYLINDER: -2.75

## 2023-03-30 ASSESSMENT — SPHEQUIV_DERIVED
OS_SPHEQUIV: -0.75
OD_SPHEQUIV: -1.125

## 2023-04-11 ENCOUNTER — NON-APPOINTMENT (OUTPATIENT)
Age: 63
End: 2023-04-11

## 2023-04-12 ENCOUNTER — EMERGENCY (EMERGENCY)
Facility: HOSPITAL | Age: 63
LOS: 1 days | Discharge: HOME | End: 2023-04-12
Attending: EMERGENCY MEDICINE | Admitting: EMERGENCY MEDICINE
Payer: MEDICARE

## 2023-04-12 VITALS
RESPIRATION RATE: 16 BRPM | SYSTOLIC BLOOD PRESSURE: 145 MMHG | HEIGHT: 68 IN | DIASTOLIC BLOOD PRESSURE: 78 MMHG | WEIGHT: 169.98 LBS | TEMPERATURE: 99 F | OXYGEN SATURATION: 96 % | HEART RATE: 96 BPM

## 2023-04-12 PROCEDURE — 99282 EMERGENCY DEPT VISIT SF MDM: CPT

## 2023-04-12 PROCEDURE — 99283 EMERGENCY DEPT VISIT LOW MDM: CPT

## 2023-04-12 PROCEDURE — 99284 EMERGENCY DEPT VISIT MOD MDM: CPT

## 2023-04-12 NOTE — ED PROVIDER NOTE - NSFOLLOWUPINSTRUCTIONS_ED_ALL_ED_FT
-- Start miralax 1 scoop of powder in 6 oz of water or juice twice daily    -- Also start senna 2 tabs twice daily.    -- You can stop treatment once you have a good bowel movement.    -- Return to the ER for worsening or persistent symptoms, and/or ANY NEW OR CONCERNING SYMPTOMS.

## 2023-04-12 NOTE — ED ADULT NURSE NOTE - ED STAT RN HANDOFF DETAILS
discharge instructions explained and given to patient and wife.  all questions answered. patient safely discharged from unit.

## 2023-04-12 NOTE — ED PROVIDER NOTE - OBJECTIVE STATEMENT
pt with intermittent crampy abd pain for the last 2 days in the setting of now BM for last 4 days. pt states his diet was very different because of Passover. pt denies any n/v, denies fever, denies any pain right now, denies any decrease in appetite. no prior abd surgeries.

## 2023-04-12 NOTE — ED PROVIDER NOTE - PATIENT PORTAL LINK FT
You can access the FollowMyHealth Patient Portal offered by [facility] by registering at the following website: [email or web address]. By joining [employer]’s FollowMyHealth portal, you will also be able to view your health information using other applications (apps) compatible with our system.

## 2023-05-31 ENCOUNTER — RESULT CHARGE (OUTPATIENT)
Age: 63
End: 2023-05-31

## 2023-05-31 ENCOUNTER — NON-APPOINTMENT (OUTPATIENT)
Age: 63
End: 2023-05-31

## 2023-05-31 ENCOUNTER — APPOINTMENT (OUTPATIENT)
Dept: UROLOGY | Facility: CLINIC | Age: 63
End: 2023-05-31
Payer: MEDICARE

## 2023-05-31 VITALS
HEIGHT: 68 IN | BODY MASS INDEX: 25.61 KG/M2 | HEART RATE: 65 BPM | DIASTOLIC BLOOD PRESSURE: 94 MMHG | SYSTOLIC BLOOD PRESSURE: 151 MMHG | OXYGEN SATURATION: 96 % | WEIGHT: 169 LBS | TEMPERATURE: 98 F

## 2023-05-31 LAB
BILIRUB UR QL STRIP: NORMAL
CLARITY UR: CLEAR
COLLECTION METHOD: NORMAL
GLUCOSE UR-MCNC: NORMAL
HCG UR QL: 0.2 EU/DL
HGB UR QL STRIP.AUTO: NORMAL
KETONES UR-MCNC: NORMAL
LEUKOCYTE ESTERASE UR QL STRIP: NORMAL
NITRITE UR QL STRIP: NORMAL
PH UR STRIP: 5
PROT UR STRIP-MCNC: NORMAL
SP GR UR STRIP: 1.02

## 2023-05-31 PROCEDURE — 99214 OFFICE O/P EST MOD 30 MIN: CPT | Mod: 25

## 2023-05-31 PROCEDURE — 51798 US URINE CAPACITY MEASURE: CPT

## 2023-05-31 RX ORDER — SILDENAFIL 100 MG/1
100 TABLET, FILM COATED ORAL
Qty: 10 | Refills: 6 | Status: ACTIVE | COMMUNITY
Start: 2023-02-27 | End: 1900-01-01

## 2023-05-31 NOTE — ASSESSMENT
[FreeTextEntry1] : I discussed the findings and options with . CATY PATIÑO in detail. He is doing well urologically and no intervention is required for his stable voiding symptoms.\par \par As for the erectile dysfunction, he will continue with Viagra 1/2 100mg prn.\par \par Providing there are no new problems, I look forward to seeing Mr. Patiño in one year (bladder sono, PSA). He can have a follow-up renal sonogram in 2 to 3 years.\par

## 2023-05-31 NOTE — PHYSICAL EXAM
[General Appearance - Well Developed] : well developed [General Appearance - Well Nourished] : well nourished [Normal Appearance] : normal appearance [Well Groomed] : well groomed [General Appearance - In No Acute Distress] : no acute distress [Abdomen Soft] : soft [Abdomen Tenderness] : non-tender [Abdomen Mass (___ Cm)] : no abdominal mass palpated [Abdomen Hernia] : no hernia was discovered [Costovertebral Angle Tenderness] : no ~M costovertebral angle tenderness [Urethral Meatus] : meatus normal [Penis Abnormality] : normal circumcised penis [Urinary Bladder Findings] : the bladder was normal on palpation [Scrotum] : the scrotum was normal [Epididymis] : the epididymides were normal [Testes Tenderness] : no tenderness of the testes [Testes Mass (___cm)] : there were no testicular masses [Prostate Tenderness] : the prostate was not tender [No Prostate Nodules] : no prostate nodules [Skin Color & Pigmentation] : normal skin color and pigmentation [Heart Rate And Rhythm] : Heart rate and rhythm were normal [Edema] : no peripheral edema [] : no respiratory distress [Respiration, Rhythm And Depth] : normal respiratory rhythm and effort [Exaggerated Use Of Accessory Muscles For Inspiration] : no accessory muscle use [Oriented To Time, Place, And Person] : oriented to person, place, and time [Affect] : the affect was normal [Mood] : the mood was normal [Not Anxious] : not anxious [Normal Station and Gait] : the gait and station were normal for the patient's age [No Focal Deficits] : no focal deficits [Inguinal Lymph Nodes Enlarged Bilaterally] : inguinal

## 2023-05-31 NOTE — HISTORY OF PRESENT ILLNESS
[FreeTextEntry1] : Mr. CATY PATIÑO returns for his annual urologic follow-up. He reports minimal stable chronic lower urinary tract symptoms (frequency), with nocturia x 0-1. He underwent a TURP in 2013 with a significant durable improvement.\par IPSS: 2/0\par Sono (performed to assess bladder emptying): PVR 37 cc\par \par Mr. Patiño had one episode of renal colic in 2013 and underwent a successful ESWL of a ureteral stone.  He has not had any recurrent episodes of colic or hematuria.  He has been asymptomatic since.\par \par Mr. Patiño has a several year onset of erectile dysfunction with difficulty achieving and maintaining erections.  He uses Viagra 50 mg as needed, successfully.\par \par PSAs:  1/17/23--2.52; 1/21/22--2.89;  2/10/21--2.34; 5/25/19--1.9; 1/29/19--2.3; \par \par MRI: 9/10/18--Tiny hepatic cysts; \par \par Abdominal US: 8/26/15--Normal study; \par \par Renal US: 4/27/23--stable 5 mm right simple renal cyst, no stones or hydronephrosis bilaterally; 6/23/21--No intrarenal calculi or hydronephrosis. Unchanged 5mm simple right midpole cortical cyst; 3/7/19--No stones. 4mm right renal cyst. \par \par CT: 1/2/13--1.0cm left stone (1500 HU) at the L4 level. Minimal hydronephrosis;

## 2023-08-02 ENCOUNTER — APPOINTMENT (OUTPATIENT)
Dept: GASTROENTEROLOGY | Facility: CLINIC | Age: 63
End: 2023-08-02
Payer: MEDICARE

## 2023-08-02 VITALS
HEIGHT: 68 IN | BODY MASS INDEX: 25.91 KG/M2 | WEIGHT: 171 LBS | SYSTOLIC BLOOD PRESSURE: 147 MMHG | DIASTOLIC BLOOD PRESSURE: 88 MMHG | HEART RATE: 64 BPM | OXYGEN SATURATION: 96 %

## 2023-08-02 DIAGNOSIS — Z80.0 FAMILY HISTORY OF MALIGNANT NEOPLASM OF DIGESTIVE ORGANS: ICD-10-CM

## 2023-08-02 DIAGNOSIS — Z80.0 ENCOUNTER FOR SCREENING FOR MALIGNANT NEOPLASM OF COLON: ICD-10-CM

## 2023-08-02 DIAGNOSIS — Z12.11 ENCOUNTER FOR SCREENING FOR MALIGNANT NEOPLASM OF COLON: ICD-10-CM

## 2023-08-02 PROCEDURE — 99203 OFFICE O/P NEW LOW 30 MIN: CPT

## 2023-08-02 RX ORDER — SODIUM SULFATE, POTASSIUM SULFATE AND MAGNESIUM SULFATE 1.6; 3.13; 17.5 G/177ML; G/177ML; G/177ML
17.5-3.13-1.6 SOLUTION ORAL
Qty: 354 | Refills: 0 | Status: ACTIVE | COMMUNITY
Start: 2023-08-02 | End: 1900-01-01

## 2023-08-02 RX ORDER — DOXEPIN HYDROCHLORIDE 10 MG/1
10 CAPSULE ORAL
Refills: 0 | Status: ACTIVE | COMMUNITY

## 2023-08-02 NOTE — HISTORY OF PRESENT ILLNESS
[FreeTextEntry1] : Aug 02, 2023   GALI Waters 63-year-old gentleman here with his wife, family history of colon cancer  Mr. CATY PATIÑO 63 year is referred for colon cancer screening.  The patient denies any change in bowel movements, blood per rectum, abdominal, pelvic or rectal discomfort.     There is no family history of other gastrointestinal cancers.  The patient denies any unexplained weight loss, fever chills or night sweats  There is no significant cardiac or pulmonary history.  No complaints of chest pain, shortness of breath, palpitations, cough.  The patient is feeling quite well.  The patient is on no significant anticoagulant therapy or anti platelet therapy.   No adverse reaction to anesthesia in the past.

## 2023-08-02 NOTE — REASON FOR VISIT
[Initial Evaluation] : an initial evaluation [Spouse] : spouse [FreeTextEntry1] : Family history of colon cancer

## 2023-08-02 NOTE — ASSESSMENT
[FreeTextEntry1] : Impression  Family history of colon cancer  Request for colon cancer screening  Suggest  Agree with colonoscopy  Suprep  The laxative, or its risks benefits and alternatives have been thoroughly reviewed with the patient in great detail. The laxative instructions have been reviewed in great detail with the patient.  Risks/benefits: The procedure, the risks and benefits and alternatives have been reviewed in great detail with the patient.  Risks including, but not limited to sedation such as cardiac and pulmonary compromise, the procedure itself such as bleeding requiring hospitalization, transfusion, surgery, temporary or permanent colostomy.  Perforation or puncture of the requiring hospitalization, surgery, temporary colostomy. It has been explained to the patient that though colonoscopy is thought to be the best screening exam for colon cancer and polyps, no screening exam can find all colon polyps or cancers.   The patient expresses understanding of the procedure and consents to undergoing the procedure.

## 2023-08-02 NOTE — PHYSICAL EXAM
[Alert] : alert [Normal Voice/Communication] : normal voice/communication [Healthy Appearing] : healthy appearing [No Acute Distress] : no acute distress [Sclera] : the sclera and conjunctiva were normal [Hearing Threshold Finger Rub Not [place]] : hearing was normal [Normal Appearance] : the appearance of the neck was normal [No Neck Mass] : no neck mass was observed [No Respiratory Distress] : no respiratory distress [No Acc Muscle Use] : no accessory muscle use [Respiration, Rhythm And Depth] : normal respiratory rhythm and effort [Heart Rate And Rhythm] : heart rate was normal and rhythm regular [Murmurs] : no murmurs [Bowel Sounds] : normal bowel sounds [Abdomen Tenderness] : non-tender [No Masses] : no abdominal mass palpated [Abdomen Soft] : soft [] : no hepatosplenomegaly [Cervical Lymph Nodes Enlarged Posterior Bilaterally] : no posterior cervical lymphadenopathy [No CVA Tenderness] : no CVA  tenderness [No Spinal Tenderness] : no spinal tenderness [Oriented To Time, Place, And Person] : oriented to person, place, and time

## 2023-08-02 NOTE — CONSULT LETTER
[Dear  ___] : Dear ~EMERY, [Consult Letter:] : I had the pleasure of evaluating your patient, [unfilled]. [Please see my note below.] : Please see my note below. [Consult Closing:] : Thank you very much for allowing me to participate in the care of this patient.  If you have any questions, please do not hesitate to contact me. [Sincerely,] : Sincerely, [FreeTextEntry2] : Dot Dunaway MD [FreeTextEntry3] : Pb Segal MD

## 2023-09-01 ENCOUNTER — APPOINTMENT (OUTPATIENT)
Dept: ORTHOPEDIC SURGERY | Facility: CLINIC | Age: 63
End: 2023-09-01
Payer: MEDICARE

## 2023-09-01 VITALS — HEIGHT: 68 IN | WEIGHT: 170 LBS | BODY MASS INDEX: 25.76 KG/M2

## 2023-09-01 PROCEDURE — 99214 OFFICE O/P EST MOD 30 MIN: CPT

## 2023-09-01 PROCEDURE — 73564 X-RAY EXAM KNEE 4 OR MORE: CPT | Mod: LT

## 2023-09-01 PROCEDURE — 73502 X-RAY EXAM HIP UNI 2-3 VIEWS: CPT

## 2023-09-01 NOTE — IMAGING
[Right] : right hip with pelvis [Dysplasia] : Dysplasia [Left] : left knee [All Views] : anteroposterior, lateral, skyline, and anteroposterior standing [Degenerative change] : Degenerative change

## 2023-09-01 NOTE — ASSESSMENT
[FreeTextEntry1] : 63 year M WITH MODERATE RT HIP AND LOW BACK PAIN. H/O SCIATICA. NO SIGNIFICANT RELIEF WITH PT AND HEP IN THE PAST. PAIN IS IN THE LOW BACK AND RADIATES INTO THE GROIN. ALSO WITH RADIATING PAIN DOWN THE ANTERIOR ASPECT OF THE THIGH. GROIN PAIN IS REPRODICABLE ON PHYSICAL EXAM. PAIN WORSENS WITH WALKING PROLONGED DISTANCES. PAIN IS AFFECTING ADL AND FUNCTIONAL ACTIVITIES. RT HIP XRAYS REVIEWED WITH DYSPLASIA. LUMBAR XRAYS FROM 6/27/23 REVIEWED WITH SCOLIOSIS, OA, DDD. TREATMENT OPTIONS REVIEWED. QUESTIONS ANSWERED.   ALSO WITH MILD LT KNEE PAIN. XRAYS REVIEWED WITH MILD DEGENERATIVE CHANGES. TREATMENT OPTIONS REVIEWED. QUESTIONS ANSWERED.   WILL ORDER RT HIP MRI TO EVAL FOR LABRAL TEAR, OA

## 2023-09-01 NOTE — HISTORY OF PRESENT ILLNESS
[Gradual] : gradual [3] : 3 [2] : 2 [Localized] : localized [Radiating] : radiating [Intermittent] : intermittent [Household chores] : household chores [Nothing helps with pain getting better] : Nothing helps with pain getting better [Standing] : standing [Walking] : walking [de-identified] : 09/01/23 PT PRESENTS HERE TODAY WITH RIGHT HIP/LEFT KNEE PAIN FOR MONTHS PT HAS TRY PT IN THE PAST WITH NO RELIEF  RIGHT HIP PAIN FOR MONTHS ON AND OFF. WORSE RECENTLY. pAIN IN RIGHT LOW BACK TO THIGH AND KNEE AND GROIN NO SIG RELEIF WITH PT AND HEP. OCCASIONAL NUMBESS TO 4TH TOE.NO PARESTHESIAS, WORSE AFTER POWER WALKING. HX OF SCIATICA  LEFT KNEE PAIN OCCSSIONALLY MEDIALLY. NOT CONSTANT, NO BUCKLING.   [] : no [FreeTextEntry1] : LEFT KNEE/RIGHT HIP  [FreeTextEntry5] : NO INJURY  [FreeTextEntry6] : KNEE [FreeTextEntry9] : ADVIL  [de-identified] : PHYSICAL THERAPY  Advancement Flap (Double) Text: The defect edges were debeveled with a #15 scalpel blade.  Given the location of the defect and the proximity to free margins a double advancement flap was deemed most appropriate.  Using a sterile surgical marker, the appropriate advancement flaps were drawn incorporating the defect and placing the expected incisions within the relaxed skin tension lines where possible.    The area thus outlined was incised deep to adipose tissue with a #15 scalpel blade.  The skin margins were undermined to an appropriate distance in all directions utilizing iris scissors.

## 2023-09-05 ENCOUNTER — APPOINTMENT (OUTPATIENT)
Dept: MRI IMAGING | Facility: CLINIC | Age: 63
End: 2023-09-05
Payer: MEDICARE

## 2023-09-05 PROCEDURE — 73721 MRI JNT OF LWR EXTRE W/O DYE: CPT | Mod: RT,MH

## 2023-09-07 ENCOUNTER — APPOINTMENT (OUTPATIENT)
Dept: ORTHOPEDIC SURGERY | Facility: CLINIC | Age: 63
End: 2023-09-07
Payer: MEDICARE

## 2023-09-07 VITALS — WEIGHT: 170 LBS | HEIGHT: 68 IN | BODY MASS INDEX: 25.76 KG/M2

## 2023-09-07 PROCEDURE — 99214 OFFICE O/P EST MOD 30 MIN: CPT

## 2023-09-07 NOTE — HISTORY OF PRESENT ILLNESS
[Gradual] : gradual [3] : 3 [2] : 2 [Localized] : localized [Radiating] : radiating [Intermittent] : intermittent [Household chores] : household chores [Nothing helps with pain getting better] : Nothing helps with pain getting better [Standing] : standing [Walking] : walking [de-identified] : 09/01/23 PT PRESENTS HERE TODAY WITH RIGHT HIP/LEFT KNEE PAIN FOR MONTHS PT HAS TRY PT IN THE PAST WITH NO RELIEF  RIGHT HIP PAIN FOR MONTHS ON AND OFF. WORSE RECENTLY. pAIN IN RIGHT LOW BACK TO THIGH AND KNEE AND GROIN NO SIG RELEIF WITH PT AND HEP. OCCASIONAL NUMBESS TO 4TH TOE.NO PARESTHESIAS, WORSE AFTER POWER WALKING. HX OF SCIATICA  LEFT KNEE PAIN OCCSSIONALLY MEDIALLY. NOT CONSTANT, NO BUCKLING.   [] : no [FreeTextEntry1] : LEFT KNEE/RIGHT HIP  [FreeTextEntry5] : NO INJURY  [FreeTextEntry6] : KNEE [FreeTextEntry9] : ADVIL  [de-identified] : PHYSICAL THERAPY

## 2023-09-07 NOTE — ASSESSMENT
[FreeTextEntry1] : 63 year M WITH MODERATE RT HIP AND LOW BACK PAIN. H/O SCIATICA. NO SIGNIFICANT RELIEF WITH PT AND HEP IN THE PAST. PAIN IS IN THE LOW BACK AND RADIATES INTO THE GROIN. ALSO WITH RADIATING PAIN DOWN THE ANTERIOR ASPECT OF THE THIGH. GROIN PAIN IS REPRODICABLE ON PHYSICAL EXAM. PAIN WORSENS WITH WALKING PROLONGED DISTANCES. PAIN IS AFFECTING ADL AND FUNCTIONAL ACTIVITIES. RT HIP XRAYS REVIEWED WITH DYSPLASIA. LUMBAR XRAYS FROM 6/27/23 REVIEWED WITH SCOLIOSIS, OA, DDD. TREATMENT OPTIONS REVIEWED. QUESTIONS ANSWERED.   ALSO WITH MILD LT KNEE PAIN. XRAYS REVIEWED WITH MILD DEGENERATIVE CHANGES. TREATMENT OPTIONS REVIEWED. QUESTIONS ANSWERED.   RIGHT HIP MRI 9/5/23 REVIEWED WITH LABRAL TEAR, MILD DEGENERATIVE CHANGES.  WILL FOLLOW UP WITH HIS PCP REGARDING INCIDENTAL FINDING OF LYTIC LESION OF LEFT ILIAC CREST FOUND ON MRI.

## 2023-09-07 NOTE — DATA REVIEWED
[FreeTextEntry1] : RIGHT HIP MRI 9/5/23: 1. Findings suspicious for bony neoplasm within the superior left iliac crest where there appears to be an expansile, possibly lytic, lesion over an area measuring 3.7 cm. Recommend whole body bone scan to further evaluate for any active abnormality in this area as well as possible additional bone lesions (possible metastatic disease). 2. Findings consistent with tearing involving the superior and anterior labrum at the right hip with effusion, synovitis, and capsulitis and mild degenerative changes.  3. Nonspecific mild strain/edema within the left tensor facial lc muscle with adjacent subcutaneous edema in the anterior lateral left hip superficially and moderate left iliopsoas bursitis over an area measuring 4 cm.  4. Mild gluteal tendinopathy and bursitis at the right greater trochanter. 5. Clinical correlation and follow up is recommended particularly involving the lesion in the superior left iliac crest.

## 2023-09-08 ENCOUNTER — APPOINTMENT (OUTPATIENT)
Dept: ORTHOPEDIC SURGERY | Facility: CLINIC | Age: 63
End: 2023-09-08

## 2023-09-12 ENCOUNTER — NON-APPOINTMENT (OUTPATIENT)
Age: 63
End: 2023-09-12

## 2023-09-14 ENCOUNTER — NON-APPOINTMENT (OUTPATIENT)
Age: 63
End: 2023-09-14

## 2023-09-28 ENCOUNTER — NON-APPOINTMENT (OUTPATIENT)
Age: 63
End: 2023-09-28

## 2023-10-10 ENCOUNTER — APPOINTMENT (OUTPATIENT)
Dept: HEART AND VASCULAR | Facility: CLINIC | Age: 63
End: 2023-10-10
Payer: MEDICARE

## 2023-10-10 VITALS
HEIGHT: 68 IN | SYSTOLIC BLOOD PRESSURE: 157 MMHG | DIASTOLIC BLOOD PRESSURE: 81 MMHG | WEIGHT: 169 LBS | BODY MASS INDEX: 25.61 KG/M2 | HEART RATE: 71 BPM

## 2023-10-10 VITALS — DIASTOLIC BLOOD PRESSURE: 70 MMHG | SYSTOLIC BLOOD PRESSURE: 128 MMHG

## 2023-10-10 PROCEDURE — 93000 ELECTROCARDIOGRAM COMPLETE: CPT

## 2023-10-10 PROCEDURE — 99214 OFFICE O/P EST MOD 30 MIN: CPT | Mod: 25

## 2024-01-10 ENCOUNTER — APPOINTMENT (OUTPATIENT)
Dept: GASTROENTEROLOGY | Facility: AMBULATORY MEDICAL SERVICES | Age: 64
End: 2024-01-10
Payer: MEDICARE

## 2024-01-10 PROCEDURE — 45378 DIAGNOSTIC COLONOSCOPY: CPT | Mod: PT

## 2024-01-12 ENCOUNTER — APPOINTMENT (OUTPATIENT)
Dept: HEART AND VASCULAR | Facility: CLINIC | Age: 64
End: 2024-01-12
Payer: MEDICARE

## 2024-01-12 VITALS
OXYGEN SATURATION: 97 % | WEIGHT: 168 LBS | BODY MASS INDEX: 25.46 KG/M2 | DIASTOLIC BLOOD PRESSURE: 83 MMHG | TEMPERATURE: 96.5 F | HEART RATE: 89 BPM | SYSTOLIC BLOOD PRESSURE: 126 MMHG | HEIGHT: 68 IN

## 2024-01-12 DIAGNOSIS — Z00.00 ENCOUNTER FOR GENERAL ADULT MEDICAL EXAMINATION W/OUT ABNORMAL FINDINGS: ICD-10-CM

## 2024-01-12 PROCEDURE — 93000 ELECTROCARDIOGRAM COMPLETE: CPT

## 2024-01-12 PROCEDURE — G0439: CPT

## 2024-01-12 PROCEDURE — 36415 COLL VENOUS BLD VENIPUNCTURE: CPT

## 2024-01-15 LAB
ALBUMIN SERPL ELPH-MCNC: 4.8 G/DL
ALP BLD-CCNC: 96 U/L
ALT SERPL-CCNC: 26 U/L
ANION GAP SERPL CALC-SCNC: 12 MMOL/L
AST SERPL-CCNC: 23 U/L
BILIRUB DIRECT SERPL-MCNC: 0.3 MG/DL
BILIRUB INDIRECT SERPL-MCNC: 0.8 MG/DL
BILIRUB SERPL-MCNC: 1.1 MG/DL
BUN SERPL-MCNC: 20 MG/DL
CALCIUM SERPL-MCNC: 9.1 MG/DL
CHLORIDE SERPL-SCNC: 103 MMOL/L
CHOLEST SERPL-MCNC: 122 MG/DL
CO2 SERPL-SCNC: 24 MMOL/L
CREAT SERPL-MCNC: 1.03 MG/DL
EGFR: 80 ML/MIN/1.73M2
ESTIMATED AVERAGE GLUCOSE: 88 MG/DL
GLUCOSE SERPL-MCNC: 98 MG/DL
HBA1C MFR BLD HPLC: 4.7 %
HCT VFR BLD CALC: 41.9 %
HDLC SERPL-MCNC: 49 MG/DL
HGB BLD-MCNC: 14.1 G/DL
LDLC SERPL CALC-MCNC: 59 MG/DL
MCHC RBC-ENTMCNC: 31.1 PG
MCHC RBC-ENTMCNC: 33.7 GM/DL
MCV RBC AUTO: 92.3 FL
NONHDLC SERPL-MCNC: 72 MG/DL
PLATELET # BLD AUTO: 195 K/UL
POTASSIUM SERPL-SCNC: 4.5 MMOL/L
PROT SERPL-MCNC: 6.6 G/DL
PSA SERPL-MCNC: 2.99 NG/ML
RBC # BLD: 4.54 M/UL
RBC # FLD: 11.7 %
SODIUM SERPL-SCNC: 139 MMOL/L
TRIGL SERPL-MCNC: 64 MG/DL
TSH SERPL-ACNC: 1.56 UIU/ML
WBC # FLD AUTO: 4.79 K/UL

## 2024-01-15 NOTE — PHYSICAL EXAM
[Well Developed] : well developed [Well Nourished] : well nourished [No Acute Distress] : no acute distress [Normal Conjunctiva] : normal conjunctiva [Normal Venous Pressure] : normal venous pressure [No Carotid Bruit] : no carotid bruit [Normal S1, S2] : normal S1, S2 [No Murmur] : no murmur [No Rub] : no rub [No Gallop] : no gallop [Clear Lung Fields] : clear lung fields [Good Air Entry] : good air entry [No Respiratory Distress] : no respiratory distress  [Soft] : abdomen soft [Non Tender] : non-tender [No Masses/organomegaly] : no masses/organomegaly [Normal Bowel Sounds] : normal bowel sounds [Normal Gait] : normal gait [No Edema] : no edema [No Cyanosis] : no cyanosis [No Clubbing] : no clubbing [No Varicosities] : no varicosities [Normal DP B/L] : normal DP B/L [No Rash] : no rash [No Skin Lesions] : no skin lesions [Moves all extremities] : moves all extremities [No Focal Deficits] : no focal deficits [Normal Speech] : normal speech [Alert and Oriented] : alert and oriented [Normal memory] : normal memory [de-identified] : Rectal testicular and hernia exams are done by urologist.

## 2024-01-15 NOTE — DISCUSSION/SUMMARY
[EKG obtained to assist in diagnosis and management of assessed problem(s)] : EKG obtained to assist in diagnosis and management of assessed problem(s) [FreeTextEntry1] : The patient is physically active without difficulty.  His EKG is normal.  He is up-to-date on his health maintenance exams.  He received the prevnar after his visit.  He will consider the COVID-vaccine.  The patient will contact me what the names of his other providers. He is not having any cognitive impairment. He has no significant current or past depression. He is fully functional and there are no safety issues. He will be screened for eye examination, Colonoscopy and Immunizations over the next 10 years. The patient was furnished with personalized health advice and does not need referral to health education or preventative counseling services. Patient does not need advanced care planning services.  Laboratory testing was done.

## 2024-01-15 NOTE — HISTORY OF PRESENT ILLNESS
[FreeTextEntry1] : The patient had an eye exam less than a year ago.  He will see the dermatologist.  He sees the urologist every year.  His left nostril feels blocked.  He is conscious of his chest.  He had COVID a year ago.  He had the flu vaccine.  RSV was discussed.  He works with a  and does stretching and weights.

## 2024-01-18 ENCOUNTER — RX RENEWAL (OUTPATIENT)
Age: 64
End: 2024-01-18

## 2024-02-06 RX ORDER — LOSARTAN POTASSIUM 25 MG/1
25 TABLET, FILM COATED ORAL
Qty: 90 | Refills: 3 | Status: ACTIVE | COMMUNITY
Start: 2020-09-10 | End: 1900-01-01

## 2024-02-10 ENCOUNTER — NON-APPOINTMENT (OUTPATIENT)
Age: 64
End: 2024-02-10

## 2024-03-14 ENCOUNTER — NON-APPOINTMENT (OUTPATIENT)
Age: 64
End: 2024-03-14

## 2024-03-29 ENCOUNTER — OFFICE (OUTPATIENT)
Dept: URBAN - METROPOLITAN AREA CLINIC 70 | Facility: CLINIC | Age: 64
Setting detail: OPHTHALMOLOGY
End: 2024-03-29
Payer: MEDICARE

## 2024-03-29 DIAGNOSIS — H25.13: ICD-10-CM

## 2024-03-29 DIAGNOSIS — H16.223: ICD-10-CM

## 2024-03-29 DIAGNOSIS — D31.32: ICD-10-CM

## 2024-03-29 DIAGNOSIS — H40.013: ICD-10-CM

## 2024-03-29 PROCEDURE — 92083 EXTENDED VISUAL FIELD XM: CPT | Performed by: STUDENT IN AN ORGANIZED HEALTH CARE EDUCATION/TRAINING PROGRAM

## 2024-03-29 PROCEDURE — 92133 CPTRZD OPH DX IMG PST SGM ON: CPT | Performed by: STUDENT IN AN ORGANIZED HEALTH CARE EDUCATION/TRAINING PROGRAM

## 2024-03-29 PROCEDURE — 92014 COMPRE OPH EXAM EST PT 1/>: CPT | Performed by: STUDENT IN AN ORGANIZED HEALTH CARE EDUCATION/TRAINING PROGRAM

## 2024-03-29 ASSESSMENT — REFRACTION_CURRENTRX
OS_AXIS: 077
OD_SPHERE: +0.50
OD_OVR_VA: 20/
OS_OVR_VA: 20/
OD_OVR_VA: 20/
OD_SPHERE: +3.00
OS_SPHERE: +0.50
OS_CYLINDER: -2.75
OD_AXIS: 095
OS_CYLINDER: -2.75
OD_CYLINDER: -2.75
OS_AXIS: 080
OS_SPHERE: +3.00
OS_OVR_VA: 20/
OD_AXIS: 096
OD_CYLINDER: -2.75

## 2024-05-17 ENCOUNTER — NON-APPOINTMENT (OUTPATIENT)
Age: 64
End: 2024-05-17

## 2024-05-22 ENCOUNTER — APPOINTMENT (OUTPATIENT)
Dept: ENDOCRINOLOGY | Facility: CLINIC | Age: 64
End: 2024-05-22
Payer: MEDICARE

## 2024-05-22 VITALS
SYSTOLIC BLOOD PRESSURE: 132 MMHG | WEIGHT: 169 LBS | DIASTOLIC BLOOD PRESSURE: 74 MMHG | HEART RATE: 67 BPM | BODY MASS INDEX: 25.7 KG/M2

## 2024-05-22 PROCEDURE — 99213 OFFICE O/P EST LOW 20 MIN: CPT

## 2024-05-22 NOTE — HISTORY OF PRESENT ILLNESS
[FreeTextEntry1] : Mr. PATIÑO is a 64 year male with pmhx of thyroid nodules, HTN, HLD, nonobstructive CAD who presents for thyroid evaluation.  Last (initial) visit, 1/31/2023 with myself   Dx with thyroid nodules many years ago on physical exam with past provider, Dr. Decker Follows cardiologist, Dr. Dunaway Previously following Dr. Valentina Albert at [facility], last visit, 10/2021, presents to establish care  Interval change: Since last visit, s/p FNA of dominant nodule in 2/2023 Repeat thyroid US from 3/14/24 reviewed today with patient -- performed at [facility] Most recent TFTs from January 2024 reviewed with patient today Denies compressive symptoms  Now 5 grandkids, 4 in [place] and 1 here  1. 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 For this, recommend re-biopsy of this nodule. S/p repeat FNA in 2/28/24, bethesda II, benign Most recent thyroid US, 3/14/24 reveals stability of nodules  No family history of thyroid disease  No family history of thyroid cancer  No personal history of neck radiation exposure  No personal history of tobacco use

## 2024-05-22 NOTE — ASSESSMENT
[FreeTextEntry1] : 1. 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 For this, recommend re-biopsy of this nodule. 2/28/23, S/p FNA of right midpole 1.6cm nodule, bethesda II, benign. Most recent thyroid US in 3/14/24 reveals stability of thyroid nodules, including recently re-biopsied nodule Most recent TFTs from January 2024-- euthyroid -- repeat thyroid US in 1.5-2 years, will return to [facility] for imaging  Follow-up in 1.5-2 years for continued surveillance, or sooner as needed  Suellen Ballard, MS, FN-BC, [facility] 05/22/2024

## 2024-05-22 NOTE — DATA REVIEWED
[FreeTextEntry1] : Thyroid US, 3/14/24 Clinical History: 64-year-old male follow-up for thyroid nodules. Technique: Ultrasound examination of the thyroid was performed. Comparison: This study is compared to examination dated 02/01/2023. Findings: The right lobe measures 6.7 x 2.3 x 2.5 cm in size, and is homogeneous in echotexture. There is a small colloid cyst identified within the upper pole measuring 0.3 x 0.1 x 0.3 cm in size. This appears unchanged allowing for the difference in technique. This was identified as nodule 1 on the prior study. NODULE 2 - Location: Right midpole - Size: 0.8 x 0.7 x 0.6 cm - Prior size: 1.0 x 0.8 x 0.8 cm - Composition: Spongiform (0 pt) - Echogenicity: Hypoechoic (2 pt) - Shape: Wider-than-tall (0 pt) - Margin: Smooth (0 pt) - Echogenic Foci: None or large comet tail (0 pt) - TI-RADS category: TR2 - 2 pt (Not suspicious for malignancy) NODULE 3 - Location: Right mid to lower pole - Size: 0.4 x 0.2 x 0.3 cm - Prior size: 0.4 x 0.3 x 0.5 cm - Composition: Spongiform (0 pt) - Echogenicity: Hypoechoic (2 pt) - Shape: Wider-than-tall (0 pt) - Margin: Smooth (0 pt) - Echogenic Foci: None or large comet tail (0 pt) - TI-RADS category: TR2 - 2 pt (Not suspicious for malignancy) NODULE 4 - Location: Right mid to lower pole - Size: 1.6 x 1.0 x 1.2 cm - Prior size: 1.6 x 1.0 x 1.2 cm - Composition: Solid (2 pt) - Echogenicity: Hypoechoic (2 pt) - Shape: Wider-than-tall (0 pt) - Margin: Smooth (0 pt) - Echogenic Foci: None or large comet tail (0 pt) - TI-RADS category: TR4 - 4-6 pt ( FNA > 1.5cm; follow > 1cm) The isthmus measures up to 0.4 cm in thickness. The left lobe measures 6.1 x 1.8 x 1.7 cm in size is homogeneous in echotexture. NODULE 1 - Location: Left midpole - Size: 0.4 x 0.2 x 0.3 cm - Prior size: 0.4 x 0.2 x 0.3 cm - Composition: Mixed Solid and Cystic (1 pt) - Echogenicity: Hypoechoic (2 pt) - Shape: Wider-than-tall (0 pt) - Margin: Smooth (0 pt) - Echogenic Foci: None or large comet tail (0 pt) - TI-RADS category: TR3 - 3 pt ( FNA > 2.5cm, follow > 1.5cm) NODULE 2 - Location: Left midpole - Size: 0.6 x 0.5 x 0.4 cm - Prior size: 0.5 x 0.5 x 0.5 cm - Composition: Cystic (0 pt) - Echogenicity: Anechoic (0 pt) - Shape: Wider-than-tall (0 pt) - Margin: Smooth (0 pt) - Echogenic Foci: None or large comet tail (0 pt) - TI-RADS category: TR2 (not suspicious for malignancy) NODULE 3 - Location: Posterior left lower pole - Size: 0.9 x 0.5 x 0.7 cm - Prior size: 0.9 x 0.6 x 0.6 cm - Composition: Solid (2 pt) - Echogenicity: Hyperechoic or isoechoic (1 pt) - Shape: Wider-than-tall (0 pt) - Margin: Smooth (0 pt) - Echogenic Foci: None or large comet tail (0 pt) - TI-RADS category: TR3 - 3 pt ( FNA > 2.5cm, follow > 1.5cm) There are no masses in the expected location of the parathyroid glands. There is no visualized adenopathy. IMPRESSION Bilateral thyroid nodules, allowing for the difference in technique these are without appreciable change from 02/01/2023. Electronically Signed by ALDO ISIDRO M.D. 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

## 2024-05-24 ENCOUNTER — APPOINTMENT (OUTPATIENT)
Dept: ORTHOPEDIC SURGERY | Facility: CLINIC | Age: 64
End: 2024-05-24
Payer: MEDICARE

## 2024-05-24 PROCEDURE — 99214 OFFICE O/P EST MOD 30 MIN: CPT

## 2024-05-24 NOTE — REASON FOR VISIT
There are no preventive care reminders to display for this patient. Patient is up to date, no discussion needed. [FreeTextEntry2] : FOLLOW UP

## 2024-05-24 NOTE — HISTORY OF PRESENT ILLNESS
[Gradual] : gradual [3] : 3 [2] : 2 [Localized] : localized [Radiating] : radiating [Intermittent] : intermittent [Household chores] : household chores [Nothing helps with pain getting better] : Nothing helps with pain getting better [Standing] : standing [Walking] : walking [de-identified] : 09/01/23 PT PRESENTS HERE TODAY WITH RIGHT HIP/LEFT KNEE PAIN FOR MONTHS PT HAS TRY PT IN THE PAST WITH NO RELIEF  RIGHT HIP PAIN FOR MONTHS ON AND OFF. WORSE RECENTLY. pAIN IN RIGHT LOW BACK TO THIGH AND KNEE AND GROIN NO SIG RELEIF WITH PT AND HEP. OCCASIONAL NUMBESS TO 4TH TOE.NO PARESTHESIAS, WORSE AFTER POWER WALKING. HX OF SCIATICA  LEFT KNEE PAIN OCCSSIONALLY MEDIALLY. NOT CONSTANT, NO BUCKLING.   5.24.24 PATIENT HERE FOR RIGHT HIP PAIN. HE STATES THE PAIN GOT SLIGHTLY WORSE SINCE LAST VISIT. WANTS TO DISCUSS HIS OPTIONS FOR THE HIP, LIKE PHYSICAL THERAPY AND INJECTIONS. LYTIC LESION WAS SEEN BY [facility] AND CLEARED. HIP AND GROIN STILL PAINFUL WOULD LIKE TO RESTART PT AND CONSIDER HIP INJECTION BF UPCOMING TRIP  WILL SEND FOR SCHEDULING  FOLLOW UP AFTER INEJCTION [] : no [FreeTextEntry1] : LEFT KNEE/RIGHT HIP  [FreeTextEntry5] : NO INJURY  [FreeTextEntry6] : KNEE [FreeTextEntry9] : ADVIL  [de-identified] : PHYSICAL THERAPY

## 2024-05-28 PROBLEM — R39.9 LOWER URINARY TRACT SYMPTOMS (LUTS): Status: ACTIVE | Noted: 2021-06-04

## 2024-05-29 ENCOUNTER — APPOINTMENT (OUTPATIENT)
Dept: UROLOGY | Facility: CLINIC | Age: 64
End: 2024-05-29
Payer: MEDICARE

## 2024-05-29 DIAGNOSIS — J06.9 ACUTE UPPER RESPIRATORY INFECTION, UNSPECIFIED: ICD-10-CM

## 2024-05-29 DIAGNOSIS — I25.10 ATHEROSCLEROTIC HEART DISEASE OF NATIVE CORONARY ARTERY W/OUT ANGINA PECTORIS: ICD-10-CM

## 2024-05-29 DIAGNOSIS — R73.09 OTHER ABNORMAL GLUCOSE: ICD-10-CM

## 2024-05-29 DIAGNOSIS — R00.1 BRADYCARDIA, UNSPECIFIED: ICD-10-CM

## 2024-05-29 DIAGNOSIS — R40.4 TRANSIENT ALTERATION OF AWARENESS: ICD-10-CM

## 2024-05-29 DIAGNOSIS — S73.191S OTHER SPRAIN OF RIGHT HIP, SEQUELA: ICD-10-CM

## 2024-05-29 DIAGNOSIS — N52.01 ERECTILE DYSFUNCTION DUE TO ARTERIAL INSUFFICIENCY: ICD-10-CM

## 2024-05-29 DIAGNOSIS — M54.16 RADICULOPATHY, LUMBAR REGION: ICD-10-CM

## 2024-05-29 DIAGNOSIS — N20.0 CALCULUS OF KIDNEY: ICD-10-CM

## 2024-05-29 DIAGNOSIS — I65.23 OCCLUSION AND STENOSIS OF BILATERAL CAROTID ARTERIES: ICD-10-CM

## 2024-05-29 DIAGNOSIS — K21.9 GASTRO-ESOPHAGEAL REFLUX DISEASE W/OUT ESOPHAGITIS: ICD-10-CM

## 2024-05-29 DIAGNOSIS — E04.2 NONTOXIC MULTINODULAR GOITER: ICD-10-CM

## 2024-05-29 DIAGNOSIS — M51.36 OTHER INTERVERTEBRAL DISC DEGENERATION, LUMBAR REGION: ICD-10-CM

## 2024-05-29 DIAGNOSIS — Q65.89 OTHER SPECIFIED CONGENITAL DEFORMITIES OF HIP: ICD-10-CM

## 2024-05-29 DIAGNOSIS — M25.551 PAIN IN RIGHT HIP: ICD-10-CM

## 2024-05-29 DIAGNOSIS — M17.12 UNILATERAL PRIMARY OSTEOARTHRITIS, LEFT KNEE: ICD-10-CM

## 2024-05-29 DIAGNOSIS — R91.1 SOLITARY PULMONARY NODULE: ICD-10-CM

## 2024-05-29 DIAGNOSIS — R39.9 UNSPECIFIED SYMPTOMS AND SIGNS INVOLVING THE GENITOURINARY SYSTEM: ICD-10-CM

## 2024-05-29 DIAGNOSIS — I10 ESSENTIAL (PRIMARY) HYPERTENSION: ICD-10-CM

## 2024-05-29 DIAGNOSIS — R93.1 ABNORMAL FINDINGS ON DIAGNOSTIC IMAGING OF HEART AND CORONARY CIRCULATION: ICD-10-CM

## 2024-05-29 PROCEDURE — 99215 OFFICE O/P EST HI 40 MIN: CPT

## 2024-05-29 PROCEDURE — 51798 US URINE CAPACITY MEASURE: CPT

## 2024-05-29 RX ORDER — DOXYCYCLINE HYCLATE 100 MG/1
100 CAPSULE ORAL
Qty: 14 | Refills: 0 | Status: DISCONTINUED | COMMUNITY
Start: 2023-11-24 | End: 2024-05-29

## 2024-05-29 RX ORDER — SILDENAFIL 100 MG/1
100 TABLET, FILM COATED ORAL
Qty: 10 | Refills: 6 | Status: ACTIVE | COMMUNITY
Start: 2021-01-27 | End: 1900-01-01

## 2024-05-29 NOTE — ASSESSMENT
[FreeTextEntry1] : I discussed the findings and options with Mr. CATY PATIÑO in detail.  He is voiding well and no intervention is warranted.  Mr. Treviño will continue on the Viagra 1/2 100mg prn, and a refill was provided  Providing there are no new problems, he will followup in one year (bladder sono, PSA). He can repeat a renal sonogram prior to that visit to evaluate his history of urolithiasis.  
- - -

## 2024-05-29 NOTE — HISTORY OF PRESENT ILLNESS
[FreeTextEntry1] : Mr. CATY PATIÑO returns for his annual urologic follow-up. He reports minimal stable chronic lower urinary tract symptoms (frequency), with nocturia x 0-1. He underwent a TURP in 2013 with a significant durable improvement. IPSS: 5/1 Sono (performed to assess bladder emptying): 16cc PVR  Mr. Patiño had one episode of renal colic in 2013 and underwent a successful ESWL of a ureteral stone.  He has not had any recurrent episodes of colic or hematuria.  He has been asymptomatic since.  Mr. Patiño has a several year onset of erectile dysfunction with difficulty achieving and maintaining erections.  He uses Viagra 50 mg as needed, successfully.  PSAs:  1/12/24--2.99; 1/17/23--2.52; 1/21/22--2.89; 2/10/21--2.34; 5/25/19--1.9; 1/29/19--2.3   MRI: 9/10/18--Tiny hepatic cysts   Abdominal US: 8/26/15--Normal study   Renal US: 4/27/23--stable 5 mm right simple renal cyst, no stones or hydronephrosis bilaterally; 6/23/21--No intrarenal calculi or hydronephrosis. Unchanged 5mm simple right midpole cortical cyst; 3/7/19--No stones. 4mm right renal cyst.   CT: 1/2/13--1.0cm left stone (1500 HU) at the L4 level. Minimal hydronephrosis;

## 2024-06-28 ENCOUNTER — APPOINTMENT (OUTPATIENT)
Dept: HEART AND VASCULAR | Facility: CLINIC | Age: 64
End: 2024-06-28
Payer: MEDICARE

## 2024-06-28 DIAGNOSIS — E78.00 PURE HYPERCHOLESTEROLEMIA, UNSPECIFIED: ICD-10-CM

## 2024-06-28 DIAGNOSIS — J40 BRONCHITIS, NOT SPECIFIED AS ACUTE OR CHRONIC: ICD-10-CM

## 2024-06-28 PROCEDURE — 99213 OFFICE O/P EST LOW 20 MIN: CPT

## 2024-08-01 ENCOUNTER — APPOINTMENT (OUTPATIENT)
Dept: HEART AND VASCULAR | Facility: CLINIC | Age: 64
End: 2024-08-01
Payer: MEDICARE

## 2024-08-01 ENCOUNTER — NON-APPOINTMENT (OUTPATIENT)
Age: 64
End: 2024-08-01

## 2024-08-01 VITALS
DIASTOLIC BLOOD PRESSURE: 83 MMHG | TEMPERATURE: 97.8 F | WEIGHT: 169 LBS | BODY MASS INDEX: 25.61 KG/M2 | OXYGEN SATURATION: 96 % | SYSTOLIC BLOOD PRESSURE: 134 MMHG | HEIGHT: 68 IN | HEART RATE: 77 BPM

## 2024-08-01 VITALS — DIASTOLIC BLOOD PRESSURE: 82 MMHG | SYSTOLIC BLOOD PRESSURE: 137 MMHG

## 2024-08-01 DIAGNOSIS — E78.00 PURE HYPERCHOLESTEROLEMIA, UNSPECIFIED: ICD-10-CM

## 2024-08-01 DIAGNOSIS — I10 ESSENTIAL (PRIMARY) HYPERTENSION: ICD-10-CM

## 2024-08-01 DIAGNOSIS — R93.1 ABNORMAL FINDINGS ON DIAGNOSTIC IMAGING OF HEART AND CORONARY CIRCULATION: ICD-10-CM

## 2024-08-01 PROCEDURE — 99214 OFFICE O/P EST MOD 30 MIN: CPT | Mod: 25

## 2024-08-01 PROCEDURE — 93000 ELECTROCARDIOGRAM COMPLETE: CPT

## 2024-08-05 NOTE — PHYSICAL EXAM
[Well Developed] : well developed [Well Nourished] : well nourished [No Acute Distress] : no acute distress [Normal Conjunctiva] : normal conjunctiva [Normal Venous Pressure] : normal venous pressure [No Carotid Bruit] : no carotid bruit [Normal S1, S2] : normal S1, S2 [No Murmur] : no murmur [No Rub] : no rub [No Gallop] : no gallop [Clear Lung Fields] : clear lung fields [Good Air Entry] : good air entry [No Respiratory Distress] : no respiratory distress  [Soft] : abdomen soft [Non Tender] : non-tender [No Masses/organomegaly] : no masses/organomegaly [Normal Bowel Sounds] : normal bowel sounds [Normal Gait] : normal gait [No Edema] : no edema [No Cyanosis] : no cyanosis [No Clubbing] : no clubbing [No Varicosities] : no varicosities [Normal DP B/L] : normal DP B/L [No Rash] : no rash [No Skin Lesions] : no skin lesions [Moves all extremities] : moves all extremities [No Focal Deficits] : no focal deficits [Normal Speech] : normal speech [Alert and Oriented] : alert and oriented [Normal memory] : normal memory [de-identified] : Rectal testicular and hernia exams are done by urologist.

## 2024-08-05 NOTE — DISCUSSION/SUMMARY
[EKG obtained to assist in diagnosis and management of assessed problem(s)] : EKG obtained to assist in diagnosis and management of assessed problem(s) [FreeTextEntry1] : The patient does moderate physical activity without chest pain or dyspnea.  His cardiovascular risk is 13%.  His blood pressure is slightly elevated.  His EKG shows only sinus bradycardia.  His cholesterol is excellent.  He will continue atorvastatin.  He will return for stress test to evaluate him for coronary artery disease.  Followed up with patient after visit.  His Home blood pressure was 128/77.  Continue losartan 25 mg daily.

## 2024-08-05 NOTE — HISTORY OF PRESENT ILLNESS
[FreeTextEntry1] : The patient walks and hikes without chest pain or dyspnea.  He has had an exertional right upper leg discomfort which has improved with physical therapy.

## 2024-08-05 NOTE — PHYSICAL EXAM
[Well Developed] : well developed [Well Nourished] : well nourished [No Acute Distress] : no acute distress [Normal Conjunctiva] : normal conjunctiva [Normal Venous Pressure] : normal venous pressure [No Carotid Bruit] : no carotid bruit [Normal S1, S2] : normal S1, S2 [No Murmur] : no murmur [No Rub] : no rub [No Gallop] : no gallop [Clear Lung Fields] : clear lung fields [Good Air Entry] : good air entry [No Respiratory Distress] : no respiratory distress  [Soft] : abdomen soft [Non Tender] : non-tender [No Masses/organomegaly] : no masses/organomegaly [Normal Bowel Sounds] : normal bowel sounds [Normal Gait] : normal gait [No Edema] : no edema [No Cyanosis] : no cyanosis [No Clubbing] : no clubbing [No Varicosities] : no varicosities [Normal DP B/L] : normal DP B/L [No Rash] : no rash [No Skin Lesions] : no skin lesions [Moves all extremities] : moves all extremities [No Focal Deficits] : no focal deficits [Normal Speech] : normal speech [Alert and Oriented] : alert and oriented [Normal memory] : normal memory [de-identified] : Rectal testicular and hernia exams are done by urologist.

## 2024-08-21 DIAGNOSIS — M54.50 LOW BACK PAIN, UNSPECIFIED: ICD-10-CM

## 2024-08-21 RX ORDER — TIZANIDINE HYDROCHLORIDE 2 MG/1
2 CAPSULE ORAL
Qty: 30 | Refills: 2 | Status: ACTIVE | COMMUNITY
Start: 2024-08-21 | End: 1900-01-01

## 2025-01-09 ENCOUNTER — APPOINTMENT (OUTPATIENT)
Dept: HEART AND VASCULAR | Facility: CLINIC | Age: 65
End: 2025-01-09

## 2025-01-28 ENCOUNTER — APPOINTMENT (OUTPATIENT)
Dept: HEART AND VASCULAR | Facility: CLINIC | Age: 65
End: 2025-01-28
Payer: MEDICARE

## 2025-01-28 DIAGNOSIS — I65.23 OCCLUSION AND STENOSIS OF BILATERAL CAROTID ARTERIES: ICD-10-CM

## 2025-01-28 DIAGNOSIS — E78.00 PURE HYPERCHOLESTEROLEMIA, UNSPECIFIED: ICD-10-CM

## 2025-01-28 PROCEDURE — ZZZZZ: CPT | Mod: NC

## 2025-01-28 PROCEDURE — 93880 EXTRACRANIAL BILAT STUDY: CPT

## 2025-01-30 ENCOUNTER — NON-APPOINTMENT (OUTPATIENT)
Age: 65
End: 2025-01-30

## 2025-01-30 ENCOUNTER — APPOINTMENT (OUTPATIENT)
Dept: HEART AND VASCULAR | Facility: CLINIC | Age: 65
End: 2025-01-30

## 2025-01-30 ENCOUNTER — APPOINTMENT (OUTPATIENT)
Dept: HEART AND VASCULAR | Facility: CLINIC | Age: 65
End: 2025-01-30
Payer: MEDICARE

## 2025-01-30 VITALS
OXYGEN SATURATION: 95 % | HEIGHT: 68 IN | SYSTOLIC BLOOD PRESSURE: 123 MMHG | WEIGHT: 170 LBS | DIASTOLIC BLOOD PRESSURE: 70 MMHG | BODY MASS INDEX: 25.76 KG/M2 | TEMPERATURE: 97.6 F | HEART RATE: 75 BPM

## 2025-01-30 DIAGNOSIS — R93.1 ABNORMAL FINDINGS ON DIAGNOSTIC IMAGING OF HEART AND CORONARY CIRCULATION: ICD-10-CM

## 2025-01-30 DIAGNOSIS — R42 DIZZINESS AND GIDDINESS: ICD-10-CM

## 2025-01-30 DIAGNOSIS — Z00.00 ENCOUNTER FOR GENERAL ADULT MEDICAL EXAMINATION W/OUT ABNORMAL FINDINGS: ICD-10-CM

## 2025-01-30 DIAGNOSIS — I10 ESSENTIAL (PRIMARY) HYPERTENSION: ICD-10-CM

## 2025-01-30 PROCEDURE — 36415 COLL VENOUS BLD VENIPUNCTURE: CPT

## 2025-01-30 PROCEDURE — G0439: CPT

## 2025-01-30 PROCEDURE — 93000 ELECTROCARDIOGRAM COMPLETE: CPT

## 2025-01-30 PROCEDURE — 93306 TTE W/DOPPLER COMPLETE: CPT

## 2025-01-30 PROCEDURE — ZZZZZ: CPT | Mod: NC

## 2025-01-31 LAB
ALBUMIN SERPL ELPH-MCNC: 4.4 G/DL
ALP BLD-CCNC: 92 U/L
ALT SERPL-CCNC: 39 U/L
ANION GAP SERPL CALC-SCNC: 10 MMOL/L
AST SERPL-CCNC: 27 U/L
BILIRUB DIRECT SERPL-MCNC: 0.2 MG/DL
BILIRUB INDIRECT SERPL-MCNC: 0.7 MG/DL
BILIRUB SERPL-MCNC: 1 MG/DL
BUN SERPL-MCNC: 15 MG/DL
CALCIUM SERPL-MCNC: 9.2 MG/DL
CHLORIDE SERPL-SCNC: 104 MMOL/L
CHOLEST SERPL-MCNC: 122 MG/DL
CO2 SERPL-SCNC: 27 MMOL/L
CREAT SERPL-MCNC: 0.97 MG/DL
EGFR: 86 ML/MIN/1.73M2
ESTIMATED AVERAGE GLUCOSE: 88 MG/DL
GLUCOSE SERPL-MCNC: 98 MG/DL
HBA1C MFR BLD HPLC: 4.7 %
HCT VFR BLD CALC: 41.4 %
HDLC SERPL-MCNC: 54 MG/DL
HGB BLD-MCNC: 13.8 G/DL
LDLC SERPL CALC-MCNC: 57 MG/DL
MCHC RBC-ENTMCNC: 31.7 PG
MCHC RBC-ENTMCNC: 33.3 G/DL
MCV RBC AUTO: 95.2 FL
NONHDLC SERPL-MCNC: 68 MG/DL
PLATELET # BLD AUTO: 194 K/UL
POTASSIUM SERPL-SCNC: 4.8 MMOL/L
PROT SERPL-MCNC: 6.4 G/DL
PSA SERPL-MCNC: 4.33 NG/ML
RBC # BLD: 4.35 M/UL
RBC # FLD: 11.2 %
SODIUM SERPL-SCNC: 140 MMOL/L
TRIGL SERPL-MCNC: 43 MG/DL
TSH SERPL-ACNC: 1.77 UIU/ML
WBC # FLD AUTO: 4.65 K/UL

## 2025-02-11 ENCOUNTER — APPOINTMENT (OUTPATIENT)
Dept: UROLOGY | Facility: CLINIC | Age: 65
End: 2025-02-11
Payer: MEDICARE

## 2025-02-11 ENCOUNTER — NON-APPOINTMENT (OUTPATIENT)
Age: 65
End: 2025-02-11

## 2025-02-11 VITALS
BODY MASS INDEX: 25.76 KG/M2 | WEIGHT: 170 LBS | HEIGHT: 68 IN | HEART RATE: 90 BPM | TEMPERATURE: 97.3 F | DIASTOLIC BLOOD PRESSURE: 73 MMHG | SYSTOLIC BLOOD PRESSURE: 154 MMHG

## 2025-02-11 DIAGNOSIS — N52.01 ERECTILE DYSFUNCTION DUE TO ARTERIAL INSUFFICIENCY: ICD-10-CM

## 2025-02-11 DIAGNOSIS — R97.20 ELEVATED PROSTATE, SPECIFIC ANTIGEN [PSA]: ICD-10-CM

## 2025-02-11 DIAGNOSIS — R39.9 UNSPECIFIED SYMPTOMS AND SIGNS INVOLVING THE GENITOURINARY SYSTEM: ICD-10-CM

## 2025-02-11 PROCEDURE — 51798 US URINE CAPACITY MEASURE: CPT

## 2025-02-11 PROCEDURE — G2211 COMPLEX E/M VISIT ADD ON: CPT

## 2025-02-11 PROCEDURE — 99214 OFFICE O/P EST MOD 30 MIN: CPT

## 2025-02-13 LAB
APPEARANCE: CLEAR
BACTERIA UR CULT: NORMAL
BACTERIA: NEGATIVE /HPF
BILIRUBIN URINE: NEGATIVE
BLOOD URINE: NEGATIVE
CAST: 1 /LPF
COLOR: YELLOW
EPITHELIAL CELLS: 0 /HPF
GLUCOSE QUALITATIVE U: NEGATIVE MG/DL
KETONES URINE: NEGATIVE MG/DL
LEUKOCYTE ESTERASE URINE: NEGATIVE
MICROSCOPIC-UA: NORMAL
NITRITE URINE: NEGATIVE
PH URINE: 5.5
PROTEIN URINE: NEGATIVE MG/DL
RED BLOOD CELLS URINE: 0 /HPF
SPECIFIC GRAVITY URINE: 1.01
UROBILINOGEN URINE: 0.2 MG/DL
WHITE BLOOD CELLS URINE: 1 /HPF

## 2025-02-21 ENCOUNTER — APPOINTMENT (OUTPATIENT)
Dept: ORTHOPEDIC SURGERY | Facility: CLINIC | Age: 65
End: 2025-02-21
Payer: MEDICARE

## 2025-02-21 VITALS
SYSTOLIC BLOOD PRESSURE: 156 MMHG | DIASTOLIC BLOOD PRESSURE: 83 MMHG | BODY MASS INDEX: 25.76 KG/M2 | HEART RATE: 63 BPM | WEIGHT: 170 LBS | HEIGHT: 68 IN

## 2025-02-21 DIAGNOSIS — Q65.89 OTHER SPECIFIED CONGENITAL DEFORMITIES OF HIP: ICD-10-CM

## 2025-02-21 PROCEDURE — 73502 X-RAY EXAM HIP UNI 2-3 VIEWS: CPT

## 2025-02-21 PROCEDURE — 99204 OFFICE O/P NEW MOD 45 MIN: CPT

## 2025-04-24 ENCOUNTER — RX RENEWAL (OUTPATIENT)
Age: 65
End: 2025-04-24

## 2025-05-09 ENCOUNTER — RX ONLY (RX ONLY)
Age: 65
End: 2025-05-09

## 2025-05-09 ENCOUNTER — OFFICE (OUTPATIENT)
Dept: URBAN - METROPOLITAN AREA CLINIC 70 | Facility: CLINIC | Age: 65
Setting detail: OPHTHALMOLOGY
End: 2025-05-09
Payer: MEDICARE

## 2025-05-09 DIAGNOSIS — H16.223: ICD-10-CM

## 2025-05-09 DIAGNOSIS — H40.013: ICD-10-CM

## 2025-05-09 DIAGNOSIS — H25.13: ICD-10-CM

## 2025-05-09 DIAGNOSIS — D31.32: ICD-10-CM

## 2025-05-09 PROCEDURE — 92014 COMPRE OPH EXAM EST PT 1/>: CPT | Performed by: STUDENT IN AN ORGANIZED HEALTH CARE EDUCATION/TRAINING PROGRAM

## 2025-05-09 PROCEDURE — 76514 ECHO EXAM OF EYE THICKNESS: CPT | Performed by: STUDENT IN AN ORGANIZED HEALTH CARE EDUCATION/TRAINING PROGRAM

## 2025-05-09 PROCEDURE — 92133 CPTRZD OPH DX IMG PST SGM ON: CPT | Performed by: STUDENT IN AN ORGANIZED HEALTH CARE EDUCATION/TRAINING PROGRAM

## 2025-05-09 PROCEDURE — 92083 EXTENDED VISUAL FIELD XM: CPT | Performed by: STUDENT IN AN ORGANIZED HEALTH CARE EDUCATION/TRAINING PROGRAM

## 2025-05-09 ASSESSMENT — PACHYMETRY
OD_CT_UM: 540
OS_CT_CORRECTION: 0
OD_CT_CORRECTION: 0
OS_CT_UM: 543

## 2025-05-09 ASSESSMENT — REFRACTION_CURRENTRX
OD_CYLINDER: -2.75
OS_CYLINDER: -2.75
OD_AXIS: 095
OS_AXIS: 080
OD_CYLINDER: -2.75
OS_OVR_VA: 20/
OS_CYLINDER: -2.75
OD_SPHERE: +0.50
OS_VPRISM_DIRECTION: SV
OD_AXIS: 096
OD_OVR_VA: 20/
OD_SPHERE: +3.00
OS_OVR_VA: 20/
OD_VPRISM_DIRECTION: SV
OS_SPHERE: +3.00
OS_AXIS: 077
OD_OVR_VA: 20/
OS_SPHERE: +0.50
OD_VPRISM_DIRECTION: SV
OS_VPRISM_DIRECTION: SV

## 2025-05-09 ASSESSMENT — KERATOMETRY
OS_K2POWER_DIOPTERS: 45.75
OD_AXISANGLE_DEGREES: 178
OD_K1POWER_DIOPTERS: 44.00
OS_K1POWER_DIOPTERS: 44.25
OD_K2POWER_DIOPTERS: 45.25
OS_AXISANGLE_DEGREES: 17

## 2025-05-09 ASSESSMENT — REFRACTION_AUTOREFRACTION
OS_CYLINDER: -3.25
OD_SPHERE: +0.75
OD_AXIS: 088
OD_CYLINDER: -3.00
OS_SPHERE: +0.75
OS_AXIS: 081

## 2025-05-09 ASSESSMENT — VISUAL ACUITY
OS_BCVA: 20/20
OD_BCVA: 20/20

## 2025-05-09 ASSESSMENT — CONFRONTATIONAL VISUAL FIELD TEST (CVF)
OS_FINDINGS: FULL
OD_FINDINGS: FULL